# Patient Record
Sex: FEMALE | Race: ASIAN | NOT HISPANIC OR LATINO | URBAN - METROPOLITAN AREA
[De-identification: names, ages, dates, MRNs, and addresses within clinical notes are randomized per-mention and may not be internally consistent; named-entity substitution may affect disease eponyms.]

---

## 2019-06-11 ENCOUNTER — EMERGENCY (EMERGENCY)
Facility: HOSPITAL | Age: 29
LOS: 1 days | Discharge: HOME | End: 2019-06-11
Attending: EMERGENCY MEDICINE | Admitting: EMERGENCY MEDICINE
Payer: COMMERCIAL

## 2019-06-11 VITALS
OXYGEN SATURATION: 100 % | DIASTOLIC BLOOD PRESSURE: 89 MMHG | TEMPERATURE: 97 F | RESPIRATION RATE: 18 BRPM | HEART RATE: 59 BPM | SYSTOLIC BLOOD PRESSURE: 126 MMHG

## 2019-06-11 VITALS
SYSTOLIC BLOOD PRESSURE: 101 MMHG | HEART RATE: 68 BPM | OXYGEN SATURATION: 96 % | RESPIRATION RATE: 17 BRPM | DIASTOLIC BLOOD PRESSURE: 72 MMHG

## 2019-06-11 PROCEDURE — 99053 MED SERV 10PM-8AM 24 HR FAC: CPT

## 2019-06-11 PROCEDURE — 99283 EMERGENCY DEPT VISIT LOW MDM: CPT | Mod: 25

## 2019-06-11 RX ORDER — ACETAMINOPHEN 500 MG
650 TABLET ORAL ONCE
Refills: 0 | Status: DISCONTINUED | OUTPATIENT
Start: 2019-06-11 | End: 2019-06-11

## 2019-06-11 RX ORDER — OXYMETAZOLINE HYDROCHLORIDE 0.5 MG/ML
2 SPRAY NASAL ONCE
Refills: 0 | Status: COMPLETED | OUTPATIENT
Start: 2019-06-11 | End: 2019-06-11

## 2019-06-11 RX ORDER — IBUPROFEN 200 MG
600 TABLET ORAL ONCE
Refills: 0 | Status: DISCONTINUED | OUTPATIENT
Start: 2019-06-11 | End: 2019-06-11

## 2019-06-11 RX ORDER — LORATADINE 10 MG/1
10 TABLET ORAL ONCE
Refills: 0 | Status: DISCONTINUED | OUTPATIENT
Start: 2019-06-11 | End: 2019-06-11

## 2019-06-11 RX ADMIN — Medication 100 MILLIGRAM(S): at 05:11

## 2019-06-11 RX ADMIN — Medication 600 MILLIGRAM(S): at 05:11

## 2019-06-11 RX ADMIN — OXYMETAZOLINE HYDROCHLORIDE 2 SPRAY(S): 0.5 SPRAY NASAL at 05:11

## 2019-06-11 NOTE — ED PROVIDER NOTE - ATTENDING CONTRIBUTION TO CARE
HPI: 28 F w/ no PMH p/w 1 week of weakness, cough, sob, rhinorrhea and nasal congestion/discharge. Pt thinks she may have had a fever last week but never measured her temperature. Denies any sick contact or recent travel. Cough is productive of small amount of clear phlegm. She took leftover Amox for last 3 days and not helping. Denies any chest pain, abd pain, nausea, vomiting, hematuria or dysuria.  EXAM: NAD, lungs ctab, heart RRR, no M/R/G/JVD, no pitting edema BLE. abd soft nontender.   MDM: concern for URI, unlikely PNA vs PE. Will provide meds and reassess.

## 2019-06-11 NOTE — ED PROVIDER NOTE - NS ED ROS FT
CONSTITUTIONAL:  +fever that resolved  HEENT:  No rhinorrhea  SKIN:  No rash or itching.  CARDIOVASCULAR:  No chest pain, chest pressure or chest discomfort. No palpitations or edema.  RESPIRATORY:  +SOB + cough  GASTROINTESTINAL:  No nausea, vomiting or diarrhea. No abdominal pain.   GENITOURINARY:  Denies hematuria, dysuria.   NEUROLOGICAL:  No headache, dizziness, syncope.   MUSCULOSKELETAL:  No muscle, back pain, joint pain or stiffness.  HEMATOLOGIC:  No bleeding or bruising.

## 2019-06-11 NOTE — ED ADULT NURSE NOTE - OBJECTIVE STATEMENT
pt a&Ox3, c/o intermittent SOB, +congestion, productive cough x1 week. pt breathing even & unlabored, denies cp, abd pain, fever, chills, numbness, tingling, dizziness. MD NATHANIEL at bedside, awaiting further orders.

## 2019-06-11 NOTE — ED ADULT TRIAGE NOTE - CHIEF COMPLAINT QUOTE
Pt p/w with SOB for 3/4 days.  + congestion.  + productive cough.  denies fevers chills.  SOB worse lying down and increase SOB with activity.  respirations even unlabored at this time.  EKG in progress.

## 2019-06-11 NOTE — ED PROVIDER NOTE - OBJECTIVE STATEMENT
28 F w/ no PMH p/w 1 week of weakness, cough, sob, rhinorrhea and nasal congestion/discharge. Pt thinks she may have had a fever last week but never measured her temperature. Denies any sick contact or recent travel. Cough is productive of small amount of clear phlegm. Denies any chest pain, abd pain, nausea, vomiting, hematuria or dysuria.

## 2019-06-11 NOTE — ED ADULT NURSE NOTE - NSIMPLEMENTINTERV_GEN_ALL_ED
Implemented All Universal Safety Interventions:  [place] to call system. Call bell, personal items and telephone within reach. Instruct patient to call for assistance. Room bathroom lighting operational. Non-slip footwear when patient is off stretcher. Physically safe environment: no spills, clutter or unnecessary equipment. Stretcher in lowest position, wheels locked, appropriate side rails in place.

## 2019-06-11 NOTE — ED PROVIDER NOTE - PROGRESS NOTE DETAILS
Symptoms improved with meds. No longer coughing. Will discharge home, explained why there is no need for PO Abx at this time as she has no fever, lungs ctab. Will discharge and advise to f/u with her PMD. Return precautions explained.

## 2019-12-09 ENCOUNTER — RESULT REVIEW (OUTPATIENT)
Age: 29
End: 2019-12-09

## 2020-02-12 ENCOUNTER — APPOINTMENT (OUTPATIENT)
Dept: ORTHOPEDIC SURGERY | Facility: CLINIC | Age: 30
End: 2020-02-12
Payer: COMMERCIAL

## 2020-02-12 VITALS
HEIGHT: 61 IN | WEIGHT: 157 LBS | DIASTOLIC BLOOD PRESSURE: 83 MMHG | HEART RATE: 80 BPM | SYSTOLIC BLOOD PRESSURE: 116 MMHG | BODY MASS INDEX: 29.64 KG/M2

## 2020-02-12 DIAGNOSIS — Z78.9 OTHER SPECIFIED HEALTH STATUS: ICD-10-CM

## 2020-02-12 DIAGNOSIS — Z86.39 PERSONAL HISTORY OF OTHER ENDOCRINE, NUTRITIONAL AND METABOLIC DISEASE: ICD-10-CM

## 2020-02-12 DIAGNOSIS — M54.16 RADICULOPATHY, LUMBAR REGION: ICD-10-CM

## 2020-02-12 PROBLEM — Z00.00 ENCOUNTER FOR PREVENTIVE HEALTH EXAMINATION: Status: ACTIVE | Noted: 2020-02-12

## 2020-02-12 PROCEDURE — 99204 OFFICE O/P NEW MOD 45 MIN: CPT

## 2020-02-12 RX ORDER — IBUPROFEN 600 MG/1
600 TABLET, FILM COATED ORAL
Qty: 30 | Refills: 0 | Status: ACTIVE | COMMUNITY
Start: 2019-09-23

## 2020-02-12 RX ORDER — DICLOFENAC SODIUM 10 MG/G
1 GEL TOPICAL
Qty: 100 | Refills: 0 | Status: ACTIVE | COMMUNITY
Start: 2020-02-06

## 2020-02-12 RX ORDER — CYCLOBENZAPRINE HYDROCHLORIDE 5 MG/1
5 TABLET, FILM COATED ORAL
Qty: 10 | Refills: 0 | Status: ACTIVE | COMMUNITY
Start: 2020-02-06

## 2020-02-12 RX ORDER — LIDOCAINE AND PRILOCAINE 25; 25 MG/G; MG/G
2.5-2.5 CREAM TOPICAL
Qty: 30 | Refills: 0 | Status: ACTIVE | COMMUNITY
Start: 2019-12-02

## 2020-02-12 RX ORDER — TRIAMCINOLONE ACETONIDE 1 MG/G
0.1 CREAM TOPICAL
Qty: 15 | Refills: 0 | Status: ACTIVE | COMMUNITY
Start: 2020-01-31

## 2020-02-12 RX ORDER — MELOXICAM 15 MG/1
15 TABLET ORAL
Qty: 30 | Refills: 1 | Status: ACTIVE | COMMUNITY
Start: 2020-02-12 | End: 1900-01-01

## 2020-02-12 RX ORDER — ERGOCALCIFEROL 1.25 MG/1
1.25 MG CAPSULE, LIQUID FILLED ORAL
Qty: 4 | Refills: 0 | Status: ACTIVE | COMMUNITY
Start: 2019-11-25

## 2020-02-12 NOTE — REVIEW OF SYSTEMS
[Arthralgia] : arthralgia [Joint Stiffness] : joint stiffness [Joint Pain] : joint pain [Negative] : Psychiatric

## 2020-02-12 NOTE — PHYSICAL EXAM
[de-identified] : Physical examination of the lumbosacral spine discloses mild muscle spasm to the paravertebral lumbar region primarily on the left side. No acute N/V deficits the lower extremities; motor and sensory intact; deep tendon reflexes bilaterally 1+ symmetric. Negative straight leg raise. [de-identified] : Review of submitted x-rays of the lumbosacral spine and hips are reported within normal limits
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
## 2020-02-12 NOTE — DISCUSSION/SUMMARY
[de-identified] : The patient is advised on increasing her NSAID medications to Mobic. She declined referral back to physical therapy. Further investigational studies such as MRI evaluation are warranted if the patient's symptoms persist or worsen.

## 2020-03-11 ENCOUNTER — APPOINTMENT (OUTPATIENT)
Dept: MRI IMAGING | Facility: IMAGING CENTER | Age: 30
End: 2020-03-11

## 2020-04-23 ENCOUNTER — APPOINTMENT (OUTPATIENT)
Dept: ORTHOPEDIC SURGERY | Facility: CLINIC | Age: 30
End: 2020-04-23

## 2020-05-20 ENCOUNTER — APPOINTMENT (OUTPATIENT)
Dept: PULMONOLOGY | Facility: CLINIC | Age: 30
End: 2020-05-20
Payer: MEDICAID

## 2020-05-20 DIAGNOSIS — R06.02 SHORTNESS OF BREATH: ICD-10-CM

## 2020-05-20 DIAGNOSIS — Z83.3 FAMILY HISTORY OF DIABETES MELLITUS: ICD-10-CM

## 2020-05-20 PROCEDURE — 99204 OFFICE O/P NEW MOD 45 MIN: CPT | Mod: 95

## 2020-05-20 RX ORDER — LEVOTHYROXINE SODIUM 0.1 MG/1
100 TABLET ORAL
Refills: 0 | Status: ACTIVE | COMMUNITY
Start: 2019-08-06

## 2020-05-20 NOTE — HISTORY OF PRESENT ILLNESS
[Medical Office: ([facility])___] : at the medical office located in  [Home] : at home, [unfilled] , at the time of the visit. [Snoring] : snoring [Frequent Nocturnal Awakening] : frequent nocturnal awakening [Witnessed Apneas] : witnessed sleep apnea [ESS: ___] : ESS score [unfilled] [Daytime Somnolence] : daytime somnolence [Unintentional Sleep While Inactive] : unintentional sleep while inactive [Awakes with Headache] : headache upon awakening [Awakes Unrefreshed] : awakening unrefreshed [Awakening With Dry Mouth] : awakening with dry mouth [To Bed: ___] : ~he/she~ goes to bed at [unfilled] [Arises: ___] : arises at [unfilled] [Sleep Onset Latency: ___ minutes] : sleep onset latency of [unfilled] minutes reported [Nocturnal Awakenings: ___] : ~he/she~ typically has [unfilled] nocturnal awakenings [WASO: ___] : Wake time after sleep onset is [unfilled] [TST: ___] : Total sleep time is [unfilled] [Daytime Sleep: ___] : daytime sleep: [unfilled] [FreeTextEntry1] : 29 year old female here for a telehealth visit due to COVID19 pandemic for shortness of breath and difficulty with sleep for 1 month.\par \par Patient reports testing positive for COVID19 on 4/2. Her symptoms were relatively mild with low grade fever and "heating sensations in head". Since then, she began feeling shortness of breath and she feels that "something is blocking her breathing" at night. She also reports pain on the right side of the chest, non-reproducible, nonpleuritic along with palpitations. She was evaluated by cardiology - had an echocardiogram and CTA chest with were both reportedly normal. She was prescribed propranolol which she did not tolerate and was switched to metoprolol. She has a pulse oximeter and oxygen saturations remain at 99% and HR goes to 130s with exertion. She has been isolating from rest of her family since the diagnosis and only recently with them two days prior. She has had repeat COVID testing on 4/22 which was positive but tested negative on 5/2.\par \par She also reports a swelling in throat. She was evaluated by ENT, Dr. Perlman. "No blockage" was detected on his exam but she reports a swollen sensation particularly in the right side of her throat. She was prescribed Cefdinir for "mucous in nose" for 10 days.\par \par She endorses snoring, does not believe it is severe. She did not have sleeping issues prior to COVID. She reports popping noises, that went away with azithromycin but the sensation that breathing is blocked remains. These episodes occur 2 - 3 times per week. She endorses a 10 lb weight loss over the last month.\par \par Comorbid medical conditions include hypothyroidism on Synthroid, vertigo. [Unintentional Sleep while Active] : no unintentional sleep while active [Recent  Weight Gain] : no recent weight gain

## 2020-05-20 NOTE — ASSESSMENT
[FreeTextEntry1] : This is a 29 year old female with recent COVID19 infection with dyspnea on exertion, palpitations, and sleep issues.  As she reports that there is no associated desaturations with her dyspnea, I am suspecting deconditioning as a cause as she has largely been sedentary for the last two months. In addition, TTE and CTA are reportedly normal. I have asked her to have the reports faxed over to the office. Anxiety may be playing a role as well. Reassurance was provided. Will check a sleep study to evaluate for sleep-disordering breathing as a cause of her nocturnal symptoms. \par \par Follow up after HST (PSG cannot be ordered due to COVID19 pandemic at this time).\par

## 2020-05-20 NOTE — REASON FOR VISIT
[Initial Evaluation] : an initial evaluation [Shortness of Breath] : shortness of Breath [Sleep Apnea] : sleep apnea

## 2020-05-26 ENCOUNTER — EMERGENCY (EMERGENCY)
Facility: HOSPITAL | Age: 30
LOS: 1 days | Discharge: HOME | End: 2020-05-26
Attending: EMERGENCY MEDICINE | Admitting: EMERGENCY MEDICINE
Payer: MEDICAID

## 2020-05-26 VITALS
DIASTOLIC BLOOD PRESSURE: 96 MMHG | SYSTOLIC BLOOD PRESSURE: 130 MMHG | RESPIRATION RATE: 16 BRPM | TEMPERATURE: 98 F | OXYGEN SATURATION: 100 % | HEART RATE: 138 BPM

## 2020-05-26 VITALS
RESPIRATION RATE: 20 BRPM | SYSTOLIC BLOOD PRESSURE: 114 MMHG | DIASTOLIC BLOOD PRESSURE: 75 MMHG | HEART RATE: 90 BPM | OXYGEN SATURATION: 99 % | TEMPERATURE: 98 F

## 2020-05-26 LAB
ALBUMIN SERPL ELPH-MCNC: 4.5 G/DL — SIGNIFICANT CHANGE UP (ref 3.3–5)
ALP SERPL-CCNC: 73 U/L — SIGNIFICANT CHANGE UP (ref 40–120)
ALT FLD-CCNC: 15 U/L — SIGNIFICANT CHANGE UP (ref 4–33)
ANION GAP SERPL CALC-SCNC: 14 MMO/L — SIGNIFICANT CHANGE UP (ref 7–14)
APTT BLD: 37.8 SEC — HIGH (ref 27.5–36.3)
AST SERPL-CCNC: 14 U/L — SIGNIFICANT CHANGE UP (ref 4–32)
BASOPHILS # BLD AUTO: 0.03 K/UL — SIGNIFICANT CHANGE UP (ref 0–0.2)
BASOPHILS NFR BLD AUTO: 0.3 % — SIGNIFICANT CHANGE UP (ref 0–2)
BILIRUB SERPL-MCNC: 0.3 MG/DL — SIGNIFICANT CHANGE UP (ref 0.2–1.2)
BUN SERPL-MCNC: 5 MG/DL — LOW (ref 7–23)
CALCIUM SERPL-MCNC: 9.5 MG/DL — SIGNIFICANT CHANGE UP (ref 8.4–10.5)
CHLORIDE SERPL-SCNC: 103 MMOL/L — SIGNIFICANT CHANGE UP (ref 98–107)
CO2 SERPL-SCNC: 22 MMOL/L — SIGNIFICANT CHANGE UP (ref 22–31)
CREAT SERPL-MCNC: 0.58 MG/DL — SIGNIFICANT CHANGE UP (ref 0.5–1.3)
D DIMER BLD IA.RAPID-MCNC: < 150 NG/ML — SIGNIFICANT CHANGE UP
EOSINOPHIL # BLD AUTO: 0.22 K/UL — SIGNIFICANT CHANGE UP (ref 0–0.5)
EOSINOPHIL NFR BLD AUTO: 2.3 % — SIGNIFICANT CHANGE UP (ref 0–6)
GLUCOSE SERPL-MCNC: 144 MG/DL — HIGH (ref 70–99)
HCG SERPL-ACNC: < 5 MIU/ML — SIGNIFICANT CHANGE UP
HCT VFR BLD CALC: 42.7 % — SIGNIFICANT CHANGE UP (ref 34.5–45)
HGB BLD-MCNC: 13.7 G/DL — SIGNIFICANT CHANGE UP (ref 11.5–15.5)
IMM GRANULOCYTES NFR BLD AUTO: 0.4 % — SIGNIFICANT CHANGE UP (ref 0–1.5)
INR BLD: 1.04 — SIGNIFICANT CHANGE UP (ref 0.88–1.17)
LYMPHOCYTES # BLD AUTO: 1.94 K/UL — SIGNIFICANT CHANGE UP (ref 1–3.3)
LYMPHOCYTES # BLD AUTO: 20.2 % — SIGNIFICANT CHANGE UP (ref 13–44)
MCHC RBC-ENTMCNC: 28.1 PG — SIGNIFICANT CHANGE UP (ref 27–34)
MCHC RBC-ENTMCNC: 32.1 % — SIGNIFICANT CHANGE UP (ref 32–36)
MCV RBC AUTO: 87.5 FL — SIGNIFICANT CHANGE UP (ref 80–100)
MONOCYTES # BLD AUTO: 0.81 K/UL — SIGNIFICANT CHANGE UP (ref 0–0.9)
MONOCYTES NFR BLD AUTO: 8.4 % — SIGNIFICANT CHANGE UP (ref 2–14)
NEUTROPHILS # BLD AUTO: 6.58 K/UL — SIGNIFICANT CHANGE UP (ref 1.8–7.4)
NEUTROPHILS NFR BLD AUTO: 68.4 % — SIGNIFICANT CHANGE UP (ref 43–77)
NRBC # FLD: 0 K/UL — SIGNIFICANT CHANGE UP (ref 0–0)
NT-PROBNP SERPL-SCNC: 84.87 PG/ML — SIGNIFICANT CHANGE UP
PLATELET # BLD AUTO: 490 K/UL — HIGH (ref 150–400)
PMV BLD: 9.4 FL — SIGNIFICANT CHANGE UP (ref 7–13)
POTASSIUM SERPL-MCNC: 3.8 MMOL/L — SIGNIFICANT CHANGE UP (ref 3.5–5.3)
POTASSIUM SERPL-SCNC: 3.8 MMOL/L — SIGNIFICANT CHANGE UP (ref 3.5–5.3)
PROT SERPL-MCNC: 7.7 G/DL — SIGNIFICANT CHANGE UP (ref 6–8.3)
PROTHROM AB SERPL-ACNC: 12 SEC — SIGNIFICANT CHANGE UP (ref 9.8–13.1)
RBC # BLD: 4.88 M/UL — SIGNIFICANT CHANGE UP (ref 3.8–5.2)
RBC # FLD: 13.2 % — SIGNIFICANT CHANGE UP (ref 10.3–14.5)
SODIUM SERPL-SCNC: 139 MMOL/L — SIGNIFICANT CHANGE UP (ref 135–145)
TROPONIN T, HIGH SENSITIVITY: < 6 NG/L — SIGNIFICANT CHANGE UP (ref ?–14)
TSH SERPL-MCNC: 0.4 UIU/ML — SIGNIFICANT CHANGE UP (ref 0.27–4.2)
WBC # BLD: 9.62 K/UL — SIGNIFICANT CHANGE UP (ref 3.8–10.5)
WBC # FLD AUTO: 9.62 K/UL — SIGNIFICANT CHANGE UP (ref 3.8–10.5)

## 2020-05-26 PROCEDURE — 93010 ELECTROCARDIOGRAM REPORT: CPT

## 2020-05-26 PROCEDURE — 70360 X-RAY EXAM OF NECK: CPT | Mod: 26

## 2020-05-26 PROCEDURE — 71045 X-RAY EXAM CHEST 1 VIEW: CPT | Mod: 26

## 2020-05-26 PROCEDURE — 99284 EMERGENCY DEPT VISIT MOD MDM: CPT | Mod: 25

## 2020-05-26 RX ORDER — MECLIZINE HCL 12.5 MG
1 TABLET ORAL
Qty: 12 | Refills: 0
Start: 2020-05-26 | End: 2020-05-29

## 2020-05-26 RX ORDER — SODIUM CHLORIDE 9 MG/ML
1000 INJECTION INTRAMUSCULAR; INTRAVENOUS; SUBCUTANEOUS ONCE
Refills: 0 | Status: COMPLETED | OUTPATIENT
Start: 2020-05-26 | End: 2020-05-26

## 2020-05-26 RX ORDER — LEVOTHYROXINE SODIUM 125 MCG
1 TABLET ORAL
Qty: 0 | Refills: 0 | DISCHARGE

## 2020-05-26 RX ORDER — FAMOTIDINE 10 MG/ML
20 INJECTION INTRAVENOUS ONCE
Refills: 0 | Status: COMPLETED | OUTPATIENT
Start: 2020-05-26 | End: 2020-05-26

## 2020-05-26 RX ADMIN — FAMOTIDINE 20 MILLIGRAM(S): 10 INJECTION INTRAVENOUS at 17:58

## 2020-05-26 RX ADMIN — SODIUM CHLORIDE 1000 MILLILITER(S): 9 INJECTION INTRAMUSCULAR; INTRAVENOUS; SUBCUTANEOUS at 17:58

## 2020-05-26 RX ADMIN — SODIUM CHLORIDE 1000 MILLILITER(S): 9 INJECTION INTRAMUSCULAR; INTRAVENOUS; SUBCUTANEOUS at 16:14

## 2020-05-26 NOTE — ED PROVIDER NOTE - NS ED ROS FT
CONSTITUTIONAL:  +Weight loss. No fever  HEENT: No headache, no photophobia  SKIN:  No rash or itching.  CARDIOVASCULAR:  +Palpitations. No chest pain, chest pressure or chest discomfort. No edema.  RESPIRATORY: +SOB. No cough  GASTROINTESTINAL:  No nausea, vomiting or diarrhea. No abdominal pain.   GENITOURINARY:  Denies hematuria, dysuria.   NEUROLOGICAL:  +Lightheadedness. +Unsteady. No syncope  MUSCULOSKELETAL:  No muscle, back pain, joint pain or stiffness.  HEMATOLOGIC:  No bleeding or bruising.

## 2020-05-26 NOTE — ED PROVIDER NOTE - CARE PLAN
Assessment and plan of treatment:	Patient is a 30y/o F with hx of hypothyroidism, vertigo, and recent COVID infection presenting with tachycardia for 4wks and SOB for 2 wks. Principal Discharge DX:	Tachycardia  Assessment and plan of treatment:	Patient is a 28y/o F with hx of hypothyroidism, vertigo, and recent COVID infection presenting with tachycardia for 4wks and SOB for 2 wks. Principal Discharge DX:	Tachycardia  Assessment and plan of treatment:	Patient is a 28y/o F with hx of hypothyroidism, vertigo, and recent COVID infection presenting with tachycardia for 4wks and SOB for 2 wks.  Secondary Diagnosis:	SOB (shortness of breath)

## 2020-05-26 NOTE — ED ADULT TRIAGE NOTE - CHIEF COMPLAINT QUOTE
Pt. c/o elevated HR. States it started the end of April but has become worse associated with headaches, sob, palpitations and lightheadedness. Denies cp, cough or fevers. Pt was positive for covid beginning of April. Had positive antibody test on 5/13/20.

## 2020-05-26 NOTE — ED PROVIDER NOTE - NSFOLLOWUPCLINICS_GEN_ALL_ED_FT
[facility]  Cardiology  [address] [place] [id number]  Phone: [phone]  Fax:   Follow Up Time:

## 2020-05-26 NOTE — ED PROVIDER NOTE - OBJECTIVE STATEMENT
The patient is a 28y/o Female with hx of hypothyroidism, vertigo, and recent COVID infection presenting with 4 weeks of tachycardia and two weeks of dyspnea and lightheadedness. She was COVID-positive in early April and had positive antibodies on May 13. Four weeks ago (4/23), she recorded a HR of 160s after feeling palpitations; the pt is an RN. She reports the HR has declined somewhat, to usually be in the 130s for the past two weeks. She reports occasional HR of 50s-60s. Also in the past two weeks, she endorses dyspnea at rest and worsening on exertion, "pulsations and vibrations" on the R neck, poor sleep, and unsteadiness besides her usual vertigo. Endorses taking levothyroxine 100mcg qd. Endorses 13lb wt loss over past month. Denies headache, change in vision, photosensitivity, chest pain, abdominal pain, n/v/d, changes in appetite.

## 2020-05-26 NOTE — ED PROVIDER NOTE - PATIENT PORTAL LINK FT
You can access the FollowMyHealth Patient Portal offered by [facility] by registering at the following website: [email or web address]. By joining [employer]’s FollowMyHealth portal, you will also be able to view your health information using other applications (apps) compatible with our system.

## 2020-05-26 NOTE — ED PROVIDER NOTE - ATTENDING CONTRIBUTION TO CARE
DR. GARCÍA, ATTENDING MD-  I performed a face to face bedside interview with the patient regarding history of present illness, review of symptoms and past medical history. I completed an independent physical exam.  I have discussed the patient's plan of care with the resident.   Documentation as above in the note.    30 y/o female h/o hypothyroidism p/w fast heart rate, sob, fb sensation in throat x2-3 weeks.  States had recent covid infection in April.  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  No h/o dvt/pe, no ocp use, no leg pain/swelling.  Denies f/c, ha, neck stiffness, cp, cough, abd pain, n/v/d, dysuria, rash.  Afebrile, anxious, oropharynx clear, uvula midline, full rom neck, no palp mass, no carotid bruit, ctabil, s1s2 tachy no m/r/g, abd soft non ttp no r/g, no cva tenderness b/l, no leg swelling b/l, no rash.  Evaluate for anemia, pregnancy, lyte abn, consider pe low risk by wells cannot perc out d/t hr.  Obtain cbc cmp dimer trop cxr xr neck ekg ucg give ivf bolus reassess.

## 2020-05-26 NOTE — ED PROVIDER NOTE - CLINICAL SUMMARY MEDICAL DECISION MAKING FREE TEXT BOX
Verena: Patient is a 28y/o F with hx of hypothyroidism, vertigo, and recent COVID infection presenting with tachycardia for 4wks and SOB for 2 wks. Plan: rehydration, EKG, CXR, TSH, reassess.     Low concern for PE at this time. Patient does not require d dimer testing. Verena: Patient is a 30y/o F with hx of hypothyroidism, vertigo, and recent COVID infection presenting with tachycardia for 4wks and SOB for 2 wks. Plan: rehydration, EKG, CXR, TSH, reassess.

## 2020-05-26 NOTE — ED PROVIDER NOTE - PLAN OF CARE
Patient is a 30y/o F with hx of hypothyroidism, vertigo, and recent COVID infection presenting with tachycardia for 4wks and SOB for 2 wks.

## 2020-05-26 NOTE — ED PROVIDER NOTE - PHYSICAL EXAMINATION
GENERAL APPEARANCE: NAD  HEENT:  NC/AT, clear conjunctiva  NECK: Neck supple, non-tender without lymphadenopathy, masses. No carotid bruits  CARDIAC: Tachycardic. Normal S1 and S2. No S3, S4 or m/r/g  LUNGS: CTAB  ABDOMEN:    MUSKULOSKELETAL: No joint erythema or tenderness.   EXTREMITIES: No edema.  NEUROLOGICAL: No focal neurologic deficit. Negative Romberg  SKIN: Skin clean, dry, intact. Normal turgor  PSYCHIATRIC: Normal affect and behavior.

## 2020-05-26 NOTE — ED PROVIDER NOTE - NSFOLLOWUPINSTRUCTIONS_ED_ALL_ED_FT
You were seen for rapid heart rate.     Seek immediate medical assistance for any new or worsening symptoms such as chest pain or any other worsening or concerning symptoms to you.     Follow up with your PCP this week. You were seen for rapid heart rate.     Seek immediate medical assistance for any new or worsening symptoms such as chest pain or any other worsening or concerning symptoms to you.     Follow up with your PCP this week.    Follow up with a cardiologist for 24 hour holter monitor.

## 2020-06-29 PROBLEM — R42 DIZZINESS AND GIDDINESS: Chronic | Status: ACTIVE | Noted: 2020-05-26

## 2020-06-29 PROBLEM — E03.9 HYPOTHYROIDISM, UNSPECIFIED: Chronic | Status: ACTIVE | Noted: 2020-05-26

## 2020-07-20 ENCOUNTER — APPOINTMENT (OUTPATIENT)
Dept: SLEEP CENTER | Facility: CLINIC | Age: 30
End: 2020-07-20

## 2020-08-14 ENCOUNTER — APPOINTMENT (OUTPATIENT)
Dept: DERMATOLOGY | Facility: CLINIC | Age: 30
End: 2020-08-14
Payer: MEDICAID

## 2020-08-14 VITALS — BODY MASS INDEX: 28.32 KG/M2 | WEIGHT: 150 LBS | HEIGHT: 61 IN

## 2020-08-14 VITALS — TEMPERATURE: 97.3 F

## 2020-08-14 DIAGNOSIS — Z78.9 OTHER SPECIFIED HEALTH STATUS: ICD-10-CM

## 2020-08-14 DIAGNOSIS — L70.0 ACNE VULGARIS: ICD-10-CM

## 2020-08-14 DIAGNOSIS — L65.0 TELOGEN EFFLUVIUM: ICD-10-CM

## 2020-08-14 DIAGNOSIS — L64.9 ANDROGENIC ALOPECIA, UNSPECIFIED: ICD-10-CM

## 2020-08-14 DIAGNOSIS — L82.1 OTHER SEBORRHEIC KERATOSIS: ICD-10-CM

## 2020-08-14 PROCEDURE — 99203 OFFICE O/P NEW LOW 30 MIN: CPT | Mod: GC

## 2020-08-14 RX ORDER — BENZOYL PEROXIDE 5 G/100G
5 GEL TOPICAL DAILY
Qty: 1 | Refills: 3 | Status: ACTIVE | COMMUNITY
Start: 2020-08-14 | End: 1900-01-01

## 2020-08-14 RX ORDER — CLINDAMYCIN PHOSPHATE 10 MG/ML
1 LOTION TOPICAL
Qty: 1 | Refills: 2 | Status: ACTIVE | COMMUNITY
Start: 2020-08-14 | End: 1900-01-01

## 2020-08-27 RX ORDER — TRETINOIN 0.25 MG/G
0.03 CREAM TOPICAL
Qty: 1 | Refills: 3 | Status: ACTIVE | COMMUNITY
Start: 2020-08-14

## 2020-11-06 ENCOUNTER — APPOINTMENT (OUTPATIENT)
Dept: DERMATOLOGY | Facility: CLINIC | Age: 30
End: 2020-11-06

## 2021-01-08 ENCOUNTER — APPOINTMENT (OUTPATIENT)
Dept: ORTHOPEDIC SURGERY | Facility: CLINIC | Age: 31
End: 2021-01-08
Payer: MEDICAID

## 2021-01-08 VITALS
BODY MASS INDEX: 28.32 KG/M2 | SYSTOLIC BLOOD PRESSURE: 124 MMHG | DIASTOLIC BLOOD PRESSURE: 86 MMHG | WEIGHT: 150 LBS | HEART RATE: 68 BPM | HEIGHT: 61 IN

## 2021-01-08 VITALS — TEMPERATURE: 97.3 F

## 2021-01-08 DIAGNOSIS — M54.16 RADICULOPATHY, LUMBAR REGION: ICD-10-CM

## 2021-01-08 PROCEDURE — 99072 ADDL SUPL MATRL&STAF TM PHE: CPT

## 2021-01-08 PROCEDURE — 99214 OFFICE O/P EST MOD 30 MIN: CPT

## 2021-02-08 ENCOUNTER — RX RENEWAL (OUTPATIENT)
Age: 31
End: 2021-02-08

## 2021-02-08 RX ORDER — DICLOFENAC SODIUM 75 MG/1
75 TABLET, DELAYED RELEASE ORAL
Qty: 60 | Refills: 1 | Status: ACTIVE | COMMUNITY
Start: 2021-01-08 | End: 1900-01-01

## 2021-07-13 ENCOUNTER — EMERGENCY (EMERGENCY)
Facility: HOSPITAL | Age: 31
LOS: 1 days | Discharge: HOME | End: 2021-07-13
Attending: STUDENT IN AN ORGANIZED HEALTH CARE EDUCATION/TRAINING PROGRAM | Admitting: STUDENT IN AN ORGANIZED HEALTH CARE EDUCATION/TRAINING PROGRAM
Payer: COMMERCIAL

## 2021-07-13 VITALS
DIASTOLIC BLOOD PRESSURE: 90 MMHG | RESPIRATION RATE: 16 BRPM | TEMPERATURE: 98 F | HEART RATE: 75 BPM | SYSTOLIC BLOOD PRESSURE: 123 MMHG | OXYGEN SATURATION: 100 %

## 2021-07-13 VITALS
SYSTOLIC BLOOD PRESSURE: 131 MMHG | DIASTOLIC BLOOD PRESSURE: 88 MMHG | TEMPERATURE: 98 F | HEART RATE: 80 BPM | RESPIRATION RATE: 12 BRPM | OXYGEN SATURATION: 100 %

## 2021-07-13 LAB
ALBUMIN SERPL ELPH-MCNC: 4.2 G/DL — SIGNIFICANT CHANGE UP (ref 3.3–5)
ALP SERPL-CCNC: 71 U/L — SIGNIFICANT CHANGE UP (ref 40–120)
ALT FLD-CCNC: 11 U/L — SIGNIFICANT CHANGE UP (ref 4–33)
ANION GAP SERPL CALC-SCNC: 14 MMOL/L — SIGNIFICANT CHANGE UP (ref 7–14)
APPEARANCE UR: CLEAR — SIGNIFICANT CHANGE UP
AST SERPL-CCNC: 13 U/L — SIGNIFICANT CHANGE UP (ref 4–32)
BASOPHILS # BLD AUTO: 0.02 K/UL — SIGNIFICANT CHANGE UP (ref 0–0.2)
BASOPHILS NFR BLD AUTO: 0.2 % — SIGNIFICANT CHANGE UP (ref 0–2)
BILIRUB SERPL-MCNC: 0.2 MG/DL — SIGNIFICANT CHANGE UP (ref 0.2–1.2)
BILIRUB UR-MCNC: NEGATIVE — SIGNIFICANT CHANGE UP
BUN SERPL-MCNC: 9 MG/DL — SIGNIFICANT CHANGE UP (ref 7–23)
CALCIUM SERPL-MCNC: 9 MG/DL — SIGNIFICANT CHANGE UP (ref 8.4–10.5)
CHLORIDE SERPL-SCNC: 104 MMOL/L — SIGNIFICANT CHANGE UP (ref 98–107)
CO2 SERPL-SCNC: 21 MMOL/L — LOW (ref 22–31)
COLOR SPEC: SIGNIFICANT CHANGE UP
CREAT SERPL-MCNC: 0.55 MG/DL — SIGNIFICANT CHANGE UP (ref 0.5–1.3)
DIFF PNL FLD: NEGATIVE — SIGNIFICANT CHANGE UP
EOSINOPHIL # BLD AUTO: 0.26 K/UL — SIGNIFICANT CHANGE UP (ref 0–0.5)
EOSINOPHIL NFR BLD AUTO: 2.6 % — SIGNIFICANT CHANGE UP (ref 0–6)
GLUCOSE SERPL-MCNC: 101 MG/DL — HIGH (ref 70–99)
GLUCOSE UR QL: NEGATIVE — SIGNIFICANT CHANGE UP
HCT VFR BLD CALC: 40.9 % — SIGNIFICANT CHANGE UP (ref 34.5–45)
HGB BLD-MCNC: 13 G/DL — SIGNIFICANT CHANGE UP (ref 11.5–15.5)
IANC: 6.53 K/UL — SIGNIFICANT CHANGE UP (ref 1.5–8.5)
IMM GRANULOCYTES NFR BLD AUTO: 0.2 % — SIGNIFICANT CHANGE UP (ref 0–1.5)
KETONES UR-MCNC: NEGATIVE — SIGNIFICANT CHANGE UP
LEUKOCYTE ESTERASE UR-ACNC: NEGATIVE — SIGNIFICANT CHANGE UP
LIDOCAIN IGE QN: 22 U/L — SIGNIFICANT CHANGE UP (ref 7–60)
LYMPHOCYTES # BLD AUTO: 2.22 K/UL — SIGNIFICANT CHANGE UP (ref 1–3.3)
LYMPHOCYTES # BLD AUTO: 22.4 % — SIGNIFICANT CHANGE UP (ref 13–44)
MAGNESIUM SERPL-MCNC: 2.2 MG/DL — SIGNIFICANT CHANGE UP (ref 1.6–2.6)
MCHC RBC-ENTMCNC: 28.4 PG — SIGNIFICANT CHANGE UP (ref 27–34)
MCHC RBC-ENTMCNC: 31.8 GM/DL — LOW (ref 32–36)
MCV RBC AUTO: 89.3 FL — SIGNIFICANT CHANGE UP (ref 80–100)
MONOCYTES # BLD AUTO: 0.86 K/UL — SIGNIFICANT CHANGE UP (ref 0–0.9)
MONOCYTES NFR BLD AUTO: 8.7 % — SIGNIFICANT CHANGE UP (ref 2–14)
NEUTROPHILS # BLD AUTO: 6.53 K/UL — SIGNIFICANT CHANGE UP (ref 1.8–7.4)
NEUTROPHILS NFR BLD AUTO: 65.9 % — SIGNIFICANT CHANGE UP (ref 43–77)
NITRITE UR-MCNC: NEGATIVE — SIGNIFICANT CHANGE UP
NRBC # BLD: 0 /100 WBCS — SIGNIFICANT CHANGE UP
NRBC # FLD: 0 K/UL — SIGNIFICANT CHANGE UP
PH UR: 6.5 — SIGNIFICANT CHANGE UP (ref 5–8)
PHOSPHATE SERPL-MCNC: 3.3 MG/DL — SIGNIFICANT CHANGE UP (ref 2.5–4.5)
PLATELET # BLD AUTO: 438 K/UL — HIGH (ref 150–400)
POTASSIUM SERPL-MCNC: 4 MMOL/L — SIGNIFICANT CHANGE UP (ref 3.5–5.3)
POTASSIUM SERPL-SCNC: 4 MMOL/L — SIGNIFICANT CHANGE UP (ref 3.5–5.3)
PROT SERPL-MCNC: 7.5 G/DL — SIGNIFICANT CHANGE UP (ref 6–8.3)
PROT UR-MCNC: NEGATIVE — SIGNIFICANT CHANGE UP
RBC # BLD: 4.58 M/UL — SIGNIFICANT CHANGE UP (ref 3.8–5.2)
RBC # FLD: 12.7 % — SIGNIFICANT CHANGE UP (ref 10.3–14.5)
SODIUM SERPL-SCNC: 139 MMOL/L — SIGNIFICANT CHANGE UP (ref 135–145)
SP GR SPEC: 1.01 — LOW (ref 1.01–1.02)
UROBILINOGEN FLD QL: SIGNIFICANT CHANGE UP
WBC # BLD: 9.91 K/UL — SIGNIFICANT CHANGE UP (ref 3.8–10.5)
WBC # FLD AUTO: 9.91 K/UL — SIGNIFICANT CHANGE UP (ref 3.8–10.5)

## 2021-07-13 PROCEDURE — 99284 EMERGENCY DEPT VISIT MOD MDM: CPT

## 2021-07-13 RX ORDER — ACETAMINOPHEN 500 MG
975 TABLET ORAL ONCE
Refills: 0 | Status: COMPLETED | OUTPATIENT
Start: 2021-07-13 | End: 2021-07-13

## 2021-07-13 RX ORDER — SODIUM CHLORIDE 9 MG/ML
1000 INJECTION INTRAMUSCULAR; INTRAVENOUS; SUBCUTANEOUS ONCE
Refills: 0 | Status: COMPLETED | OUTPATIENT
Start: 2021-07-13 | End: 2021-07-13

## 2021-07-13 RX ORDER — FAMOTIDINE 10 MG/ML
20 INJECTION INTRAVENOUS ONCE
Refills: 0 | Status: COMPLETED | OUTPATIENT
Start: 2021-07-13 | End: 2021-07-13

## 2021-07-13 RX ORDER — METOCLOPRAMIDE HCL 10 MG
10 TABLET ORAL ONCE
Refills: 0 | Status: COMPLETED | OUTPATIENT
Start: 2021-07-13 | End: 2021-07-13

## 2021-07-13 RX ORDER — MECLIZINE HCL 12.5 MG
25 TABLET ORAL ONCE
Refills: 0 | Status: COMPLETED | OUTPATIENT
Start: 2021-07-13 | End: 2021-07-13

## 2021-07-13 RX ADMIN — FAMOTIDINE 20 MILLIGRAM(S): 10 INJECTION INTRAVENOUS at 19:06

## 2021-07-13 RX ADMIN — Medication 25 MILLIGRAM(S): at 19:07

## 2021-07-13 RX ADMIN — SODIUM CHLORIDE 1000 MILLILITER(S): 9 INJECTION INTRAMUSCULAR; INTRAVENOUS; SUBCUTANEOUS at 19:06

## 2021-07-13 RX ADMIN — Medication 975 MILLIGRAM(S): at 19:35

## 2021-07-13 RX ADMIN — Medication 10 MILLIGRAM(S): at 19:07

## 2021-07-13 RX ADMIN — Medication 975 MILLIGRAM(S): at 19:06

## 2021-07-13 NOTE — ED ADULT TRIAGE NOTE - CHIEF COMPLAINT QUOTE
Pt complaint dizziness with room spinning sensation, urinary frequency, and lethargy 4xdays. Denies hematuria, emesis, chest pain or SOB. History of vertigo, hypothyroid

## 2021-07-13 NOTE — ED PROVIDER NOTE - CLINICAL SUMMARY MEDICAL DECISION MAKING FREE TEXT BOX
Yimi MONTIEL: 30F hx of hypothyroidism presents with a cc of dizziness lightheadedness mild diffuse GI upset  a/w increased urinary frequency and looser stools and feeling of sensation as if "needs to have a bowel movement" even after she goes to the bathroom sx present x4 days feels similar to prior episodes of vertigo however this episode more severe exam vss non toxic PE as above ddx c/f likely peripheral vertigo however will alaso eval for uti given frequency, check labs, upreg give ivf meds reassess thereafter.

## 2021-07-13 NOTE — ED ADULT NURSE NOTE - OBJECTIVE STATEMENT
Received report in CDU at this time, no initial nurse note documented. PT reporting dizziness, abdominal pain, urinary frequency for 4 days. States dizziness and room spinning similar to previous episode of vertigo. Pt reports nausea with no vomiting. Pt is AOX4. Pt denies chest pain or SOB. PT respirations even an unlabored. Pt appears to be comfortable, in NAD. Pt denies fever, chills, /v/d. Pt denies  dysuria, hematuria. awaiting further orders, will continue to monitor.

## 2021-07-13 NOTE — ED PROVIDER NOTE - OBJECTIVE STATEMENT
Yimi MONTIEL: 30F hx of hypothyroidism presents with a cc of dizziness lightheadedness mild diffuse GI upset  a/w increased urinary frequency and looser stools and feeling of sensation as if "needs to have a bowel movement" even after she goes to the bathroom sx present x4 days feels similar to prior episodes of vertigo however this episode more severe. Feels is worse if she moves her head, which is typical of her vertigo. +nausea but no vomiting.  Denies/f/c/cp/sob. Denies headache, syncope, abdominal pain. Denies edema. Denies hematuria, BRBPR, tarry stools, diarrhea, constipation.

## 2021-07-13 NOTE — ED ADULT NURSE NOTE - NSIMPLEMENTINTERV_GEN_ALL_ED
Implemented All Universal Safety Interventions:  [place] to call system. Call bell, personal items and telephone within reach. Instruct patient to call for assistance. Room bathroom lighting operational. Non-slip footwear when patient is off stretcher. Physically safe environment: no spills, clutter or unnecessary equipment. Stretcher in lowest position, wheels locked, appropriate side rails in place.

## 2021-07-13 NOTE — ED PROVIDER NOTE - PROGRESS NOTE DETAILS
Calixto Starks DO - pt feels better, w/u unremarkable. Comfortable w/ plan of dc w/ fu and return precautions. All questions answered. Will give pt neuro fu, has ENT fu. Yimi MONTIEL: Pt signed out to my colleague Dr. Starks pending labs reassessment.   (Back Charting)

## 2021-07-13 NOTE — ED PROVIDER NOTE - PHYSICAL EXAMINATION
Yimi MONTIEL:  VITALS: Initial triage and subsequent vitals have been reviewed by me.  GEN APPEARANCE: WDWN, alert and cooperative, non-toxic appearing and in NAD  HEAD: Atraumatic, normocephalic   EYES: PERRLa, EOMI, vision grossly intact.   EARS: Gross hearing intact.   NOSE: No nasal discharge, no external evidence of epistaxis.   NECK: Supple  CV: RRR, S1S2, no c/r/m/g. No cyanosis or pallor. Extremities warm, well perfused. Cap refill <2 seconds. No bruits.   LUNGS: CTAB. No wheezing. No rales. No rhonchi. No diminished breath sounds.   ABDOMEN: Soft, NTND. No guarding or rebound. No masses.   MSK/EXT: Spine appears normal, no spine point tenderness. No CVA ttp. Normal muscular development. Pelvis stable. No obvious joint or bony deformity, no peripheral edema.   NEURO: Alert, follows commands. Weight bearing normal. Speech normal. Sensation and motor normal x4 extremities. CN2-12 normal, coordination normal, ambulating normally.  SKIN: Normal color for race, warm, dry and intact. No evidence of rash.  PSYCH: Normal mood and affect.

## 2021-07-13 NOTE — ED PROVIDER NOTE - NSFOLLOWUPINSTRUCTIONS_ED_ALL_ED_FT
No signs of emergency medical condition on today's workup.  Presumptive diagnosis made, but further evaluation may be required by your primary care doctor or specialist for a definitive diagnosis.  Therefore, follow up as directed and if symptoms change/worsen or any emergency conditions, please return to the ER.     Take your meclizine as prescribed    Follow up with your ENT doctor this week for reevaluation    Return for any new/worsening symptoms or any other concern    Follow up with a neurologist this week as discussed with the provided contact information.

## 2021-07-13 NOTE — ED PROVIDER NOTE - PATIENT PORTAL LINK FT
You can access the FollowMyHealth Patient Portal offered by [facility] by registering at the following website: [email or web address]. By joining [employer]’s FollowMyHealth portal, you will also be able to view your health information using other applications (apps) compatible with our system.

## 2021-07-14 LAB
CULTURE RESULTS: SIGNIFICANT CHANGE UP
SPECIMEN SOURCE: SIGNIFICANT CHANGE UP
TSH SERPL-MCNC: 2.02 UIU/ML — SIGNIFICANT CHANGE UP (ref 0.27–4.2)

## 2021-07-15 ENCOUNTER — APPOINTMENT (OUTPATIENT)
Dept: NEUROLOGY | Facility: CLINIC | Age: 31
End: 2021-07-15
Payer: COMMERCIAL

## 2021-07-15 VITALS
WEIGHT: 155 LBS | HEIGHT: 61 IN | DIASTOLIC BLOOD PRESSURE: 77 MMHG | SYSTOLIC BLOOD PRESSURE: 116 MMHG | HEART RATE: 83 BPM | BODY MASS INDEX: 29.27 KG/M2

## 2021-07-15 DIAGNOSIS — R42 DIZZINESS AND GIDDINESS: ICD-10-CM

## 2021-07-15 DIAGNOSIS — G57.02 LESION OF SCIATIC NERVE, LEFT LOWER LIMB: ICD-10-CM

## 2021-07-15 PROCEDURE — 99204 OFFICE O/P NEW MOD 45 MIN: CPT

## 2021-07-15 NOTE — HISTORY OF PRESENT ILLNESS
[FreeTextEntry1] : 30-year-old woman has had 6 days of vertigo associated with her right-sided head pressure.  She went to the [facility] emergency room 2 days ago and was referred to my office.  She continues to have the vertiginous with changes of head position.  Emergency room gave her a prescription for meclizine which causes fatigue.\par \par She has a history of left-sided sciatica which is intermittently present.

## 2021-07-15 NOTE — PHYSICAL EXAM
[FreeTextEntry1] : No cognitive or communication deficits.  [place]-Hallpike maneuver elicits vertigo with left ear down.  Epley maneuver was only of temporary benefit.  She again became vertiginous upon sitting up.  No hearing loss.  Right tympanic membrane obscured by cerumen.  Left TM intact and glistening.  No hearing loss.  Remainder of cranial nerves intact.  Neck is supple.  No weakness or atrophy of the limbs.  Tendon reflexes all active and symmetric.  No pain with straight leg raising.  She has discomfort with stretching of the left piriformis.

## 2021-07-31 ENCOUNTER — APPOINTMENT (OUTPATIENT)
Dept: MRI IMAGING | Facility: IMAGING CENTER | Age: 31
End: 2021-07-31
Payer: COMMERCIAL

## 2021-07-31 ENCOUNTER — OUTPATIENT (OUTPATIENT)
Dept: OUTPATIENT SERVICES | Facility: HOSPITAL | Age: 31
LOS: 1 days | End: 2021-07-31
Payer: COMMERCIAL

## 2021-07-31 DIAGNOSIS — R42 DIZZINESS AND GIDDINESS: ICD-10-CM

## 2021-07-31 PROCEDURE — 70551 MRI BRAIN STEM W/O DYE: CPT | Mod: 26

## 2021-07-31 PROCEDURE — 70551 MRI BRAIN STEM W/O DYE: CPT

## 2021-08-04 ENCOUNTER — NON-APPOINTMENT (OUTPATIENT)
Age: 31
End: 2021-08-04

## 2021-09-30 ENCOUNTER — APPOINTMENT (OUTPATIENT)
Dept: NEUROLOGY | Facility: CLINIC | Age: 31
End: 2021-09-30

## 2022-01-05 NOTE — REVIEW OF SYSTEMS
- - - [EDS: ESS=____] : daytime somnolence: ESS=[unfilled] [Snoring] : snoring [Witnessed Apneas] : witnessed apnea [Postnasal Drip] : postnasal drip [A.M. Dry Mouth] : a.m. dry mouth [Shortness Of Breath] : shortness of breath [Palpitations] : palpitations [Obesity] : obesity [Thyroid Disease] : thyroid disease [Anxious] : anxious [Difficulty Initiating Sleep] : difficulty falling asleep [Difficulty Maintaining Sleep] : difficulty maintaining sleep [Nasal Congestion] : no nasal congestion [Chest Pain] : no chest pain [Edema] : ~T edema was not present [CHF] : no congestive heart failure [Diabetes] : no diabetes  [Anemia] : no anemia [History of Iron Deficiency] : no history of iron deficiency [Leg Dysesthesias] : no leg dysesthesias [A.M. Headache] : no headache present upon awakening [Nocturia] : no nocturia [Heartburn] : no heartburn [Fibromyalgia] : no fibromyalgia [Depression] : no depression [Arthralgias] : no arthralgias [Lower Extremity Discomfort] : no lower extremity discomfort [Unusual Sleep Behavior] : no unusual sleep behavior [Hypersomnolence] : not sleeping much more than usual

## 2022-10-24 ENCOUNTER — RESULT REVIEW (OUTPATIENT)
Age: 32
End: 2022-10-24

## 2023-01-20 ENCOUNTER — APPOINTMENT (OUTPATIENT)
Dept: VASCULAR SURGERY | Facility: CLINIC | Age: 33
End: 2023-01-20
Payer: COMMERCIAL

## 2023-01-20 VITALS
SYSTOLIC BLOOD PRESSURE: 110 MMHG | HEART RATE: 69 BPM | WEIGHT: 155 LBS | DIASTOLIC BLOOD PRESSURE: 77 MMHG | HEIGHT: 61 IN | TEMPERATURE: 98 F | BODY MASS INDEX: 29.27 KG/M2

## 2023-01-20 PROCEDURE — 93970 EXTREMITY STUDY: CPT

## 2023-01-20 PROCEDURE — 99203 OFFICE O/P NEW LOW 30 MIN: CPT

## 2023-01-26 ENCOUNTER — NON-APPOINTMENT (OUTPATIENT)
Age: 33
End: 2023-01-26

## 2023-02-07 ENCOUNTER — APPOINTMENT (OUTPATIENT)
Dept: VASCULAR SURGERY | Facility: CLINIC | Age: 33
End: 2023-02-07

## 2023-02-13 ENCOUNTER — APPOINTMENT (OUTPATIENT)
Dept: VASCULAR SURGERY | Facility: CLINIC | Age: 33
End: 2023-02-13

## 2023-03-14 ENCOUNTER — APPOINTMENT (OUTPATIENT)
Dept: VASCULAR SURGERY | Facility: CLINIC | Age: 33
End: 2023-03-14

## 2023-05-20 ENCOUNTER — RX RENEWAL (OUTPATIENT)
Age: 33
End: 2023-05-20

## 2023-08-08 ENCOUNTER — RX RENEWAL (OUTPATIENT)
Age: 33
End: 2023-08-08

## 2024-05-01 ENCOUNTER — EMERGENCY (EMERGENCY)
Facility: HOSPITAL | Age: 34
LOS: 1 days | Discharge: HOME | End: 2024-05-01
Admitting: EMERGENCY MEDICINE
Payer: COMMERCIAL

## 2024-05-01 VITALS
TEMPERATURE: 99 F | SYSTOLIC BLOOD PRESSURE: 127 MMHG | DIASTOLIC BLOOD PRESSURE: 88 MMHG | RESPIRATION RATE: 17 BRPM | OXYGEN SATURATION: 97 % | HEART RATE: 107 BPM

## 2024-05-01 LAB
BASOPHILS # BLD AUTO: 0.03 K/UL — SIGNIFICANT CHANGE UP (ref 0–0.2)
BASOPHILS NFR BLD AUTO: 0.3 % — SIGNIFICANT CHANGE UP (ref 0–2)
EOSINOPHIL # BLD AUTO: 0.35 K/UL — SIGNIFICANT CHANGE UP (ref 0–0.5)
EOSINOPHIL NFR BLD AUTO: 4.1 % — SIGNIFICANT CHANGE UP (ref 0–6)
HCT VFR BLD CALC: 36.4 % — SIGNIFICANT CHANGE UP (ref 34.5–45)
HGB BLD-MCNC: 11.8 G/DL — SIGNIFICANT CHANGE UP (ref 11.5–15.5)
IANC: 5.2 K/UL — SIGNIFICANT CHANGE UP (ref 1.8–7.4)
IMM GRANULOCYTES NFR BLD AUTO: 0.2 % — SIGNIFICANT CHANGE UP (ref 0–0.9)
LYMPHOCYTES # BLD AUTO: 2.09 K/UL — SIGNIFICANT CHANGE UP (ref 1–3.3)
LYMPHOCYTES # BLD AUTO: 24.3 % — SIGNIFICANT CHANGE UP (ref 13–44)
MCHC RBC-ENTMCNC: 27.2 PG — SIGNIFICANT CHANGE UP (ref 27–34)
MCHC RBC-ENTMCNC: 32.4 GM/DL — SIGNIFICANT CHANGE UP (ref 32–36)
MCV RBC AUTO: 83.9 FL — SIGNIFICANT CHANGE UP (ref 80–100)
MONOCYTES # BLD AUTO: 0.9 K/UL — SIGNIFICANT CHANGE UP (ref 0–0.9)
MONOCYTES NFR BLD AUTO: 10.5 % — SIGNIFICANT CHANGE UP (ref 2–14)
NEUTROPHILS # BLD AUTO: 5.2 K/UL — SIGNIFICANT CHANGE UP (ref 1.8–7.4)
NEUTROPHILS NFR BLD AUTO: 60.6 % — SIGNIFICANT CHANGE UP (ref 43–77)
NRBC # BLD: 0 /100 WBCS — SIGNIFICANT CHANGE UP (ref 0–0)
NRBC # FLD: 0 K/UL — SIGNIFICANT CHANGE UP (ref 0–0)
PLATELET # BLD AUTO: 486 K/UL — HIGH (ref 150–400)
RBC # BLD: 4.34 M/UL — SIGNIFICANT CHANGE UP (ref 3.8–5.2)
RBC # FLD: 13.2 % — SIGNIFICANT CHANGE UP (ref 10.3–14.5)
WBC # BLD: 8.59 K/UL — SIGNIFICANT CHANGE UP (ref 3.8–10.5)
WBC # FLD AUTO: 8.59 K/UL — SIGNIFICANT CHANGE UP (ref 3.8–10.5)

## 2024-05-01 PROCEDURE — 99284 EMERGENCY DEPT VISIT MOD MDM: CPT

## 2024-05-01 RX ORDER — IPRATROPIUM/ALBUTEROL SULFATE 18-103MCG
3 AEROSOL WITH ADAPTER (GRAM) INHALATION ONCE
Refills: 0 | Status: COMPLETED | OUTPATIENT
Start: 2024-05-01 | End: 2024-05-01

## 2024-05-01 RX ORDER — SODIUM CHLORIDE 9 MG/ML
1000 INJECTION INTRAMUSCULAR; INTRAVENOUS; SUBCUTANEOUS ONCE
Refills: 0 | Status: COMPLETED | OUTPATIENT
Start: 2024-05-01 | End: 2024-05-01

## 2024-05-01 RX ORDER — ACETAMINOPHEN 500 MG
1000 TABLET ORAL ONCE
Refills: 0 | Status: COMPLETED | OUTPATIENT
Start: 2024-05-01 | End: 2024-05-01

## 2024-05-01 RX ORDER — KETOROLAC TROMETHAMINE 30 MG/ML
15 SYRINGE (ML) INJECTION ONCE
Refills: 0 | Status: DISCONTINUED | OUTPATIENT
Start: 2024-05-01 | End: 2024-05-01

## 2024-05-01 RX ADMIN — Medication 400 MILLIGRAM(S): at 23:33

## 2024-05-01 RX ADMIN — SODIUM CHLORIDE 1000 MILLILITER(S): 9 INJECTION INTRAMUSCULAR; INTRAVENOUS; SUBCUTANEOUS at 23:34

## 2024-05-01 RX ADMIN — Medication 3 MILLILITER(S): at 23:34

## 2024-05-01 RX ADMIN — Medication 15 MILLIGRAM(S): at 23:37

## 2024-05-01 NOTE — ED ADULT NURSE NOTE - OBJECTIVE STATEMENT
pt c/o flu like symptoms x 2 days. endorsing sick contacts. 20g iv placed to left ac. medicated per order.

## 2024-05-01 NOTE — ED ADULT NURSE NOTE - PAIN: PRESENCE, MLM
Received call from [facility] at [facility] Sunday), tessalon perles    LMP: NA Pregnant: NA    Recommended disposition: Go to [facility], was told walk in/[facility] if no appointments. Care advice provided, patient verbalizes understanding; denies any other questions or concerns; instructed to call back for any new or worsening symptoms. Patient/Caller agrees with recommended disposition; writer provided warm transfer to [facility] at [facility] for appointment scheduling    Attention Provider: Thank you for allowing me to participate in the care of your patient. The patient was connected to triage in response to information provided to the [facility]. Please do not respond through this encounter as the response is not directed to a shared pool.         Reason for Disposition   MILD difficulty breathing (e.g., minimal/no SOB at rest, SOB with walking, pulse < 100) of new-onset or worse than normal    Protocols used: BREATHING DIFFICULTY-ADULT-[place] complains of pain/discomfort

## 2024-05-02 VITALS
RESPIRATION RATE: 18 BRPM | TEMPERATURE: 98 F | DIASTOLIC BLOOD PRESSURE: 63 MMHG | HEART RATE: 75 BPM | SYSTOLIC BLOOD PRESSURE: 101 MMHG | OXYGEN SATURATION: 100 %

## 2024-05-02 LAB
ALBUMIN SERPL ELPH-MCNC: 4.1 G/DL — SIGNIFICANT CHANGE UP (ref 3.3–5)
ALP SERPL-CCNC: 93 U/L — SIGNIFICANT CHANGE UP (ref 40–120)
ALT FLD-CCNC: 17 U/L — SIGNIFICANT CHANGE UP (ref 4–33)
ANION GAP SERPL CALC-SCNC: 12 MMOL/L — SIGNIFICANT CHANGE UP (ref 7–14)
AST SERPL-CCNC: 15 U/L — SIGNIFICANT CHANGE UP (ref 4–32)
BILIRUB SERPL-MCNC: <0.2 MG/DL — SIGNIFICANT CHANGE UP (ref 0.2–1.2)
BUN SERPL-MCNC: 7 MG/DL — SIGNIFICANT CHANGE UP (ref 7–23)
CALCIUM SERPL-MCNC: 8.7 MG/DL — SIGNIFICANT CHANGE UP (ref 8.4–10.5)
CHLORIDE SERPL-SCNC: 103 MMOL/L — SIGNIFICANT CHANGE UP (ref 98–107)
CO2 SERPL-SCNC: 24 MMOL/L — SIGNIFICANT CHANGE UP (ref 22–31)
CREAT SERPL-MCNC: 0.59 MG/DL — SIGNIFICANT CHANGE UP (ref 0.5–1.3)
EGFR: 121 ML/MIN/1.73M2 — SIGNIFICANT CHANGE UP
FLUAV AG NPH QL: SIGNIFICANT CHANGE UP
FLUBV AG NPH QL: SIGNIFICANT CHANGE UP
GLUCOSE SERPL-MCNC: 105 MG/DL — HIGH (ref 70–99)
HCG SERPL-ACNC: <1 MIU/ML — SIGNIFICANT CHANGE UP
POTASSIUM SERPL-MCNC: 4.2 MMOL/L — SIGNIFICANT CHANGE UP (ref 3.5–5.3)
POTASSIUM SERPL-SCNC: 4.2 MMOL/L — SIGNIFICANT CHANGE UP (ref 3.5–5.3)
PROT SERPL-MCNC: 7.9 G/DL — SIGNIFICANT CHANGE UP (ref 6–8.3)
RSV RNA NPH QL NAA+NON-PROBE: SIGNIFICANT CHANGE UP
SARS-COV-2 RNA SPEC QL NAA+PROBE: SIGNIFICANT CHANGE UP
SODIUM SERPL-SCNC: 139 MMOL/L — SIGNIFICANT CHANGE UP (ref 135–145)
TSH SERPL-MCNC: 7.15 UIU/ML — HIGH (ref 0.27–4.2)

## 2024-05-02 PROCEDURE — 71046 X-RAY EXAM CHEST 2 VIEWS: CPT | Mod: 26

## 2024-05-02 RX ORDER — PSEUDOEPHEDRINE HCL 30 MG
60 TABLET ORAL ONCE
Refills: 0 | Status: COMPLETED | OUTPATIENT
Start: 2024-05-02 | End: 2024-05-02

## 2024-05-02 RX ADMIN — Medication 60 MILLIGRAM(S): at 01:55

## 2024-05-02 NOTE — ED PROVIDER NOTE - PATIENT PORTAL LINK FT
You can access the FollowMyHealth Patient Portal offered by [facility] by registering at the following website: [email or web address]. By joining [employer]’s FollowMyHealth portal, you will also be able to view your health information using other applications (apps) compatible with our system.

## 2024-05-02 NOTE — ED PROVIDER NOTE - PROGRESS NOTE DETAILS
LAINA Don: Labs reviewed, WNL, TSH elevated but has known hx of hypothyroidism. CXR neg. Pt notes she has facial pressure congestion, Sudafed ordered. Pt counseled regarding signs/symptoms warranting return, all questions answered she verbalized understanding.

## 2024-05-02 NOTE — ED PROVIDER NOTE - PHYSICAL EXAMINATION
CONSTITUTIONAL: ill-appearing; well-nourished; in no apparent distress. Non-toxic appearing.   NEURO: Alert & oriented. Gait steady without assistance. Sensory and motor functions are grossly intact.  PSYCH: Mood appropriate. Thought processes intact.   ENT: EACs are without edema, erythema, or tenderness on speculum exam. TMs pearly b/l w/mild erythema and clear fluid behind TMs b/l, no bulging. Nose midline, (+)  inferior turbinate swelling b/l, no rhinorrhea. Posterior pharynx is mildly erythematous. No tonsil edema or exudates. Uvula midline.   NECK: Supple. No adenopathy.   CARD: Regular rate and rhythm, no murmurs  RESP: No accessory muscle use; breath sounds clear and equal bilaterally; no wheezes, rhonchi, or rales     ABD: Soft; non-distended; non-tender. No guarding or rebound.   MUSCULOSKELETAL/EXTREMITIES: FROM in all four extremities; no extremity edema.  SKIN: Warm; dry; no apparent lesions or exudate

## 2024-05-14 ENCOUNTER — RX RENEWAL (OUTPATIENT)
Age: 34
End: 2024-05-14

## 2024-09-04 NOTE — ED PROVIDER NOTE - NS ED NOTE AC HIGH RISK COUNTRIES
[FreeTextEntry1] : History of urinary incontinence recently started on Myrbetriq 25 mg daily.  Also has BPH.  Worsening dementia.
No

## 2025-01-03 ENCOUNTER — RX RENEWAL (OUTPATIENT)
Age: 35
End: 2025-01-03

## 2025-01-12 ENCOUNTER — EMERGENCY (EMERGENCY)
Facility: HOSPITAL | Age: 35
LOS: 1 days | Discharge: HOME | End: 2025-01-12
Attending: EMERGENCY MEDICINE | Admitting: EMERGENCY MEDICINE
Payer: COMMERCIAL

## 2025-01-12 VITALS
TEMPERATURE: 98 F | DIASTOLIC BLOOD PRESSURE: 72 MMHG | SYSTOLIC BLOOD PRESSURE: 108 MMHG | RESPIRATION RATE: 16 BRPM | HEART RATE: 91 BPM | OXYGEN SATURATION: 100 % | HEIGHT: 61 IN | WEIGHT: 160.06 LBS

## 2025-01-12 VITALS
TEMPERATURE: 98 F | OXYGEN SATURATION: 98 % | SYSTOLIC BLOOD PRESSURE: 104 MMHG | DIASTOLIC BLOOD PRESSURE: 74 MMHG | RESPIRATION RATE: 16 BRPM | HEART RATE: 72 BPM

## 2025-01-12 PROCEDURE — 99284 EMERGENCY DEPT VISIT MOD MDM: CPT

## 2025-01-12 PROCEDURE — 76801 OB US < 14 WKS SINGLE FETUS: CPT | Mod: 26

## 2025-01-12 PROCEDURE — 93971 EXTREMITY STUDY: CPT | Mod: 26,LT

## 2025-01-12 RX ORDER — ACETAMINOPHEN 80 MG/.8ML
650 SOLUTION/ DROPS ORAL ONCE
Refills: 0 | Status: COMPLETED | OUTPATIENT
Start: 2025-01-12 | End: 2025-01-12

## 2025-01-12 RX ADMIN — ACETAMINOPHEN 650 MILLIGRAM(S): 80 SOLUTION/ DROPS ORAL at 10:45

## 2025-01-12 NOTE — ED ADULT TRIAGE NOTE - CHIEF COMPLAINT QUOTE
pt states im 2 months pregnant and I have varicose veins  this morning woke with severe pain in rt mid leg  gyn sent pt in for an ultra sound r/o clot

## 2025-01-12 NOTE — ED PROVIDER NOTE - PATIENT PORTAL LINK FT
You can access the FollowMyHealth Patient Portal offered by [facility] by registering at the following website: [email or web address]. By joining [employer]’s FollowMyHealth portal, you will also be able to view your health information using other applications (apps) compatible with our system.

## 2025-01-12 NOTE — ED ADULT NURSE NOTE - OBJECTIVE STATEMENT
34 year old female, received to intake. A&Ox4, ambulatory. Respirations equal and unlabored. Denies past medical history of vertigo and hypothyroidism. Pt c/o right calf pain since this morning. Pt states it is worse when she ambulates. Pt also states she is 2 months pregnant, confirmed IUP. Denies urinary symptoms, vaginal bleeding or discharge, chest pain, SOB, palpitations, dizziness, blurry vision, numbness, tingling, n/v/d. Medicated as per EMR orders. Pending US. No acute distress noted. Safety maintained.

## 2025-01-12 NOTE — ED ADULT NURSE REASSESSMENT NOTE - NS ED NURSE REASSESS COMMENT FT1
pt alert,oriented x3. reports swelling  to r leg-  with pain upon walking. awaits ultrasound.  pt 2 mos pregnant. will continue to monitor

## 2025-01-12 NOTE — ED PROVIDER NOTE - CLINICAL SUMMARY MEDICAL DECISION MAKING FREE TEXT BOX
35 y/o female with h/o prior dvt, currently 2 months preg, here with r calf pain.  Eval for dvt.  Obtain tvus u/s venous duplex rle give acetaminophen reassess.

## 2025-01-12 NOTE — ED PROVIDER NOTE - OBJECTIVE STATEMENT
35 y/o female h/o dvt no longer on ac, hypothyroidism 2 months preg sent by ob for dvt r/o.  Pt woke up with rle pain today at calf.  Has known varicose veins she is unsure if that is what is causing the discomfort.  Denies cp sob palpitations syncope abd pain vag bld or dc.

## 2025-01-24 NOTE — ED ADULT NURSE NOTE - CAS EDN DISCHARGE INTERVENTIONS
Outreach made to patient regarding nurse visit needing to be rescheduled. Spoke to patient who advised he already removed his sutures and didn't need to schedule his appt. Patient advised to continue wound care as discussed at appt and to call if any signs of infection. Provider notified.   By MD/IV discontinued, cath removed intact

## 2025-02-10 ENCOUNTER — ASOB RESULT (OUTPATIENT)
Age: 35
End: 2025-02-10

## 2025-02-10 ENCOUNTER — APPOINTMENT (OUTPATIENT)
Dept: ANTEPARTUM | Facility: CLINIC | Age: 35
End: 2025-02-10

## 2025-02-10 PROCEDURE — 76801 OB US < 14 WKS SINGLE FETUS: CPT

## 2025-02-10 PROCEDURE — 76813 OB US NUCHAL MEAS 1 GEST: CPT

## 2025-02-20 DIAGNOSIS — R00.2 PALPITATIONS: ICD-10-CM

## 2025-02-20 RX ORDER — ASPIRIN ENTERIC COATED TABLETS 81 MG 81 MG/1
81 TABLET, DELAYED RELEASE ORAL DAILY
Refills: 0 | Status: ACTIVE | COMMUNITY

## 2025-02-20 RX ORDER — PRENATAL VIT 49/IRON FUM/FOLIC 6.75-0.2MG
TABLET ORAL DAILY
Refills: 0 | Status: ACTIVE | COMMUNITY

## 2025-02-25 ENCOUNTER — APPOINTMENT (OUTPATIENT)
Dept: CARDIOLOGY | Facility: CLINIC | Age: 35
End: 2025-02-25
Payer: COMMERCIAL

## 2025-02-25 VITALS
OXYGEN SATURATION: 100 % | BODY MASS INDEX: 30.78 KG/M2 | HEART RATE: 81 BPM | WEIGHT: 163 LBS | SYSTOLIC BLOOD PRESSURE: 130 MMHG | HEIGHT: 61 IN | DIASTOLIC BLOOD PRESSURE: 77 MMHG

## 2025-02-25 DIAGNOSIS — Z86.39 PERSONAL HISTORY OF OTHER ENDOCRINE, NUTRITIONAL AND METABOLIC DISEASE: ICD-10-CM

## 2025-02-25 DIAGNOSIS — R00.2 PALPITATIONS: ICD-10-CM

## 2025-02-25 PROCEDURE — 93242 EXT ECG>48HR<7D RECORDING: CPT

## 2025-02-25 PROCEDURE — 93000 ELECTROCARDIOGRAM COMPLETE: CPT

## 2025-02-25 PROCEDURE — 99205 OFFICE O/P NEW HI 60 MIN: CPT | Mod: 25

## 2025-04-08 ENCOUNTER — ASOB RESULT (OUTPATIENT)
Age: 35
End: 2025-04-08

## 2025-04-08 ENCOUNTER — APPOINTMENT (OUTPATIENT)
Dept: ANTEPARTUM | Facility: CLINIC | Age: 35
End: 2025-04-08
Payer: COMMERCIAL

## 2025-04-08 PROCEDURE — 76811 OB US DETAILED SNGL FETUS: CPT

## 2025-04-23 ENCOUNTER — ASOB RESULT (OUTPATIENT)
Age: 35
End: 2025-04-23

## 2025-04-23 ENCOUNTER — APPOINTMENT (OUTPATIENT)
Dept: ANTEPARTUM | Facility: CLINIC | Age: 35
End: 2025-04-23
Payer: COMMERCIAL

## 2025-04-23 PROCEDURE — 76816 OB US FOLLOW-UP PER FETUS: CPT

## 2025-05-23 ENCOUNTER — NON-APPOINTMENT (OUTPATIENT)
Age: 35
End: 2025-05-23

## 2025-05-30 ENCOUNTER — APPOINTMENT (OUTPATIENT)
Dept: MATERNAL FETAL MEDICINE | Facility: CLINIC | Age: 35
End: 2025-05-30

## 2025-05-30 ENCOUNTER — ASOB RESULT (OUTPATIENT)
Age: 35
End: 2025-05-30

## 2025-05-30 DIAGNOSIS — O24.419 GESTATIONAL DIABETES MELLITUS IN PREGNANCY, UNSPECIFIED CONTROL: ICD-10-CM

## 2025-05-30 PROCEDURE — G0109 DIAB MANAGE TRN IND/GROUP: CPT | Mod: 95

## 2025-05-30 RX ORDER — LANCETS 33 GAUGE
EACH MISCELLANEOUS
Qty: 2 | Refills: 2 | Status: ACTIVE | COMMUNITY
Start: 2025-05-30 | End: 1900-01-01

## 2025-05-30 RX ORDER — BLOOD-GLUCOSE METER
KIT MISCELLANEOUS
Qty: 2 | Refills: 2 | Status: ACTIVE | COMMUNITY
Start: 2025-05-30 | End: 1900-01-01

## 2025-05-30 RX ORDER — URINE ACETONE TEST STRIPS
STRIP MISCELLANEOUS
Qty: 2 | Refills: 2 | Status: ACTIVE | COMMUNITY
Start: 2025-05-30 | End: 1900-01-01

## 2025-05-30 RX ORDER — BLOOD-GLUCOSE METER
W/DEVICE KIT MISCELLANEOUS
Qty: 1 | Refills: 0 | Status: ACTIVE | COMMUNITY
Start: 2025-05-30 | End: 1900-01-01

## 2025-06-10 ENCOUNTER — ASOB RESULT (OUTPATIENT)
Age: 35
End: 2025-06-10

## 2025-06-10 ENCOUNTER — APPOINTMENT (OUTPATIENT)
Dept: MATERNAL FETAL MEDICINE | Facility: CLINIC | Age: 35
End: 2025-06-10

## 2025-06-10 PROCEDURE — G0108 DIAB MANAGE TRN  PER INDIV: CPT | Mod: 95

## 2025-06-11 ENCOUNTER — NON-APPOINTMENT (OUTPATIENT)
Age: 35
End: 2025-06-11

## 2025-06-17 ENCOUNTER — NON-APPOINTMENT (OUTPATIENT)
Age: 35
End: 2025-06-17

## 2025-06-26 ENCOUNTER — APPOINTMENT (OUTPATIENT)
Dept: MATERNAL FETAL MEDICINE | Facility: CLINIC | Age: 35
End: 2025-06-26

## 2025-06-26 ENCOUNTER — ASOB RESULT (OUTPATIENT)
Age: 35
End: 2025-06-26

## 2025-06-26 PROBLEM — O24.415 GESTATIONAL DIABETES MELLITUS (GDM) IN THIRD TRIMESTER CONTROLLED ON ORAL HYPOGLYCEMIC DRUG: Status: ACTIVE | Noted: 2025-06-26

## 2025-06-26 PROCEDURE — G0108 DIAB MANAGE TRN  PER INDIV: CPT | Mod: 95

## 2025-06-26 PROCEDURE — 99204 OFFICE O/P NEW MOD 45 MIN: CPT | Mod: 95

## 2025-06-26 RX ORDER — METFORMIN ER 500 MG 500 MG/1
500 TABLET ORAL
Qty: 1 | Refills: 0 | Status: ACTIVE | COMMUNITY
Start: 2025-06-26 | End: 1900-01-01

## 2025-06-27 ENCOUNTER — NON-APPOINTMENT (OUTPATIENT)
Age: 35
End: 2025-06-27

## 2025-07-15 ENCOUNTER — APPOINTMENT (OUTPATIENT)
Dept: ANTEPARTUM | Facility: CLINIC | Age: 35
End: 2025-07-15

## 2025-07-15 ENCOUNTER — ASOB RESULT (OUTPATIENT)
Age: 35
End: 2025-07-15

## 2025-07-15 ENCOUNTER — APPOINTMENT (OUTPATIENT)
Dept: MATERNAL FETAL MEDICINE | Facility: CLINIC | Age: 35
End: 2025-07-15
Payer: COMMERCIAL

## 2025-07-15 ENCOUNTER — INPATIENT (INPATIENT)
Facility: HOSPITAL | Age: 35
LOS: 0 days | Discharge: HOME | End: 2025-07-16
Attending: OBSTETRICS & GYNECOLOGY | Admitting: OBSTETRICS & GYNECOLOGY

## 2025-07-15 VITALS
TEMPERATURE: 98 F | SYSTOLIC BLOOD PRESSURE: 119 MMHG | HEART RATE: 101 BPM | OXYGEN SATURATION: 100 % | DIASTOLIC BLOOD PRESSURE: 80 MMHG | RESPIRATION RATE: 14 BRPM
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
## 2025-07-15 DIAGNOSIS — O26.899 OTHER SPECIFIED PREGNANCY RELATED CONDITIONS, UNSPECIFIED TRIMESTER: ICD-10-CM

## 2025-07-15 LAB
APTT BLD: 25.8 SEC — LOW (ref 26.1–36.8)
BLD GP AB SCN SERPL QL: NEGATIVE — SIGNIFICANT CHANGE UP
FIBRINOGEN PPP-MCNC: 395 MG/DL — SIGNIFICANT CHANGE UP (ref 200–465)
GLUCOSE BLDC GLUCOMTR-MCNC: 65 MG/DL — LOW (ref 70–99)
HCT VFR BLD CALC: 38.8 % — SIGNIFICANT CHANGE UP (ref 34.5–45)
HGB BLD-MCNC: 12.8 G/DL — SIGNIFICANT CHANGE UP (ref 11.5–15.5)
INR BLD: <0.9 RATIO — SIGNIFICANT CHANGE UP (ref 0.85–1.16)
MCHC RBC-ENTMCNC: 29.8 PG — SIGNIFICANT CHANGE UP (ref 27–34)
MCHC RBC-ENTMCNC: 33 G/DL — SIGNIFICANT CHANGE UP (ref 32–36)
MCV RBC AUTO: 90.4 FL — SIGNIFICANT CHANGE UP (ref 80–100)
NRBC # BLD AUTO: 0 K/UL — SIGNIFICANT CHANGE UP (ref 0–0)
NRBC # FLD: 0 K/UL — SIGNIFICANT CHANGE UP (ref 0–0)
NRBC BLD AUTO-RTO: 0 /100 WBCS — SIGNIFICANT CHANGE UP (ref 0–0)
PLATELET # BLD AUTO: 440 K/UL — HIGH (ref 150–400)
PMV BLD: 10.6 FL — SIGNIFICANT CHANGE UP (ref 7–13)
PROTHROM AB SERPL-ACNC: 10.3 SEC — SIGNIFICANT CHANGE UP (ref 9.9–13.4)
RBC # BLD: 4.29 M/UL — SIGNIFICANT CHANGE UP (ref 3.8–5.2)
RBC # FLD: 13.7 % — SIGNIFICANT CHANGE UP (ref 10.3–14.5)
RH IG SCN BLD-IMP: POSITIVE — SIGNIFICANT CHANGE UP
WBC # BLD: 10.43 K/UL — SIGNIFICANT CHANGE UP (ref 3.8–10.5)
WBC # FLD AUTO: 10.43 K/UL — SIGNIFICANT CHANGE UP (ref 3.8–10.5)

## 2025-07-15 PROCEDURE — G0108 DIAB MANAGE TRN  PER INDIV: CPT | Mod: 95

## 2025-07-15 RX ORDER — SODIUM CHLORIDE 9 G/1000ML
1000 INJECTION, SOLUTION INTRAVENOUS
Refills: 0 | Status: DISCONTINUED | OUTPATIENT
Start: 2025-07-15 | End: 2025-07-15

## 2025-07-15 RX ORDER — PRENATAL 136/IRON/FOLIC ACID 27 MG-1 MG
0 TABLET ORAL
Refills: 0 | DISCHARGE

## 2025-07-15 RX ORDER — ASPIRIN 325 MG
1 TABLET ORAL
Refills: 0 | DISCHARGE

## 2025-07-15 RX ORDER — OXYTOCIN-SODIUM CHLORIDE 0.9% IV SOLN 30 UNIT/500ML 30-0.9/5 UT/ML-%
167 SOLUTION INTRAVENOUS
Qty: 30 | Refills: 0 | Status: DISCONTINUED | OUTPATIENT
Start: 2025-07-15 | End: 2025-07-15

## 2025-07-15 RX ORDER — CITRIC ACID/SODIUM CITRATE 300-500 MG
15 SOLUTION, ORAL ORAL EVERY 6 HOURS
Refills: 0 | Status: DISCONTINUED | OUTPATIENT
Start: 2025-07-15 | End: 2025-07-15

## 2025-07-15 RX ADMIN — SODIUM CHLORIDE 125 MILLILITER(S): 9 INJECTION, SOLUTION INTRAVENOUS at 22:20

## 2025-07-15 NOTE — OB PROVIDER TRIAGE NOTE - HISTORY OF PRESENT ILLNESS
34 year old female P0 at 34.5 week gestation who presents s/p fall off stool ( with wheels)  at 1345p  and landed on buttocks and denied any abd trauma  denied any head trauma     denied any LOF VB    denied any contractions  pelvic pain   denied any other body pain since the fall   feels good FM       PNC   WFW  Dr Borges    stated  GDMA 2  was prescribed metformin recently   but did not start  stated diet control and stated FS are well controlled

## 2025-07-15 NOTE — OB PROVIDER H&P - NS ATTEND AMEND GEN_ALL_CORE FT
Attending Note   Agree with above   pt had trauma falling off the stool   on NST pt is contraction   will admit for 24 hour observation

## 2025-07-15 NOTE — OB PROVIDER H&P - ASSESSMENT
34 year female P0 at 34.5 week gestation  s/p Fall without Abdominal trauma  with contractions for 24 hour monitoring  r/o abruption     case  d/w Dr Derrick Abel     admission orders placed    Risks, benefits, alternatives, and possible complications have been discussed in detail with the patient in her native language.   Pre-admission, admission, and post admission procedures and expectations were discussed in detail.   All questions answered, all appropriate hospital consents were signed. Anticipate normal vaginal delivery.    Informed consent was obtained. The following was discussed:   observation and monitoring s/p fall    abruption precautions discussed     KALLIE MARINA

## 2025-07-15 NOTE — OB RN PATIENT PROFILE - NSSDOHPLACELIVE_OBGYN_A_OB
ANTICOAGULATION FOLLOW-UP CLINIC VISIT    Patient Name:  Unique Ward  Date:  4/10/2020  Contact Type:  Telephone    SUBJECTIVE:  Patient Findings     Positives:   Change in medications (On day 2 of cipro - will keep next weeks appt)             OBJECTIVE    INR Point of Care   Date Value Ref Range Status   04/10/2020 2.2 (H) 0.86 - 1.14 Final     Comment:     This test is intended for monitoring Coumadin therapy.  Results are not   accurate in patients with prolonged INR due to factor deficiency.         ASSESSMENT / PLAN  INR assessment THER    Recheck INR In: 6 DAYS    INR Location Outside lab      Anticoagulation Summary  As of 4/10/2020    INR goal:   2.0-3.0   TTR:   85.5 % (9.9 mo)   INR used for dosin.2 (4/10/2020)   Warfarin maintenance plan:   10 mg (5 mg x 2) every Mon, Fri; 7.5 mg (5 mg x 1.5) all other days   Full warfarin instructions:   10 mg every Mon, Fri; 7.5 mg all other days   Weekly warfarin total:   57.5 mg   No change documented:   Veronika Campbell RN   Plan last modified:   Veronika Campbell RN (2020)   Next INR check:   2020   Priority:   Maintenance   Target end date:       Indications    Atrial fibrillation (H) [I48.91]             Anticoagulation Episode Summary     INR check location:       Preferred lab:       Send INR reminders to:   [facility] [place]    Comments:   5 mg tabs, PM dose, print out      Anticoagulation Care Providers     Provider Role Specialty Phone number    Tyler Lee MD [facility] [phone]            See the Encounter Report to view Anticoagulation Flowsheet and Dosing Calendar (Go to Encounters tab in chart review, and find the Anticoagulation Therapy Visit)    Dosage adjustment made based on physician directed care plan.    Veronika Campbell RN                  none of the above

## 2025-07-15 NOTE — OB RN PATIENT PROFILE - WEIGHT: TOTAL WEIGHT IN LBS
Outpatient Pharmacotherapy Program    Smoking Cessation Note    Reason for Visit: Patient presents for smoking cessation pharmacotherapy  Initial Visit    HPI:    Age at Which Started Smokin  Average number of cigarettes smoked per day: 10  Additional Smoking Hx: None  Pertinent Psych Hx:  depression and anxiety  Recent quit attempts: few months ago was diagnosed with aortic aneurysm and has cut back smoking since then previous smoking 30 cigarettes per day  Medications reviewed and reconciled   Patient tried to take buproprion - felt that it helped with urges to quit but made her extremely anxious and she is wanting to try alternative therapy    Vitals:  BP:   131/87         HR:  69    Assessment:    Tobacco use disorder, willing to make quit attempt with a combination of pharmacological and behavioral therapy.    Plan:    Behavioral Modification Recommended: Specifically [facility]oking Cessation Classes    Quit Date:  23    Nicotine Replacement Therapy  Chantix            Start Pack    Potential side-effects of all prescribed pharmacotherapy reviewed with patient who verbalizes understanding of the risks and benefits of this therapy.    Follow-up :  In pharmacotherapy clinic:  Date: 23    Amina Mahajan PharmD    cc:  Renae Tariq MD    
11

## 2025-07-15 NOTE — OB RN PATIENT PROFILE - THE IMPORTANCE OF THE NEWBORN'S COMFORT AND THERMOREGULATION DURING SKIN TO SKIN: ANY PART OF INFANT SKIN NOT TOUCHING PARENT'S SKIN IS TO BE COVERED BY A BLANKET.
You can access the FollowMyHealth Patient Portal offered by [facility] by registering at the following website: [email or web address]. By joining [employer]’s FollowMyHealth portal, you will also be able to view your health information using other applications (apps) compatible with our system. Statement Selected

## 2025-07-15 NOTE — OB RN PATIENT PROFILE - GRAVIDA, OB PROFILE
Goal Outcome Evaluation:  To Do:  End of Shift Summary  For vital signs and complete assessments, please see documentation flowsheets.     Pertinent assessments: Pt A/Ox4. VSS. Oxycodone,Tylenol and ice pack for left tooth pain. Up in.  Major Shift Events: uneventful  Treatment Plan: symptom management  Bedside Nurse: Renetta Montano RN                            1

## 2025-07-15 NOTE — OB PROVIDER H&P - NS_SONOPERFORMEDBY_OBGYN_ALL_OB_FT
DATE EEG PERFORMED:  2023.      DATE EEG INTERPRETED: 2023              DURATION OF EE MINUTES         LEVEL OF CONSCIOUSENESS    Awake and Sleep.         EEG BACKGROUND    The posterior dominant basic rhythm reaches 9-10 Hz, symmetric, reactive, well-modulated and well-sustained.         EEG CLASSIFICATION    Normal        IMPRESSION      The EEG is normal in the awake and sleep states.       There are no epileptiform discharges or lateralizing signs. No typical events were recorded. There is no electrographic evidence of seizure.There is no electrographic evidence of status epilepticus.         PLEASE NOTE THAT A NON-EPILEPTIFORM EEG DOES NOT RULE OUT EPILEPSY.        IAN BRISCOE MD, FAAN    Diplomate, American Board of Psychiatry and Neurology    Diplomate, American Board of Clinical Neurophysiology      KALLIE MARINA

## 2025-07-15 NOTE — OB PROVIDER TRIAGE NOTE - NSHPPHYSICALEXAM_GEN_ALL_CORE
ICU Vital Signs Last 24 Hrs  T(C): 36.9 (15 Jul 2025 16:10), Max: 36.9 (15 Jul 2025 15:45)  T(F): 98.42 (15 Jul 2025 16:10), Max: 98.42 (15 Jul 2025 16:10)  HR: 81 (15 Jul 2025 21:03) (65 - 101)  BP: 102/61 (15 Jul 2025 19:46) (102/61 - 119/80)  BP(mean): --  ABP: --  ABP(mean): --  RR: 14 (15 Jul 2025 16:10) (14 - 14)  SpO2: 100% (15 Jul 2025 21:03) (92% - 100%)    GENERAL: pt in  NAD,   HEAD:  Atraumatic, normocephalic  EYES: PERRLA, conjunctiva and sclera clear  NECK: Supple, nontender   HEART: Regular rate and rhythm,   LUNGS: Unlabored respirations.  Clear to auscultation bilaterally   ABDOMEN: Soft, nontender, gravid      VE l/c/p/-3   GBS done  chaperone Nanacy r n   scan  ABD  Vertex AAFI  BPP 8/8   posterior placenta     images saved to ASOB   EXTREMITIES: 2+ peripheral pulses bilaterally. No clubbing, cyanosis, or edema  NERVOUS SYSTEM:  A&Ox3, moving all extremities, no focal deficits   SKIN: No rashes or lesions  NST  Baseline  (         135 ) BPM  Variability ( x )  Moderate   (  ) Minimal  (  ) Absent  (  )  Marked  Accelerations (  x) 15x15   (  ) 10x10  (  ) no  Decelerations (x  ) no  (  ) Variable  (  ) Early  (  ) Late      Description _________  Contractions (  ) no  (  x) yes     Description  _____q1-4 minutes_____  Interpretation (  x) reactive   (  )  non-reactive

## 2025-07-15 NOTE — OB PROVIDER H&P - NSHPPHYSICALEXAM_GEN_ALL_CORE
ICU Vital Signs Last 24 Hrs  T(C): 36.9 (15 Jul 2025 16:10), Max: 36.9 (15 Jul 2025 15:45)  T(F): 98.42 (15 Jul 2025 16:10), Max: 98.42 (15 Jul 2025 16:10)  HR: 81 (15 Jul 2025 21:03) (65 - 101)  BP: 102/61 (15 Jul 2025 19:46) (102/61 - 119/80)  BP(mean): --  ABP: --  ABP(mean): --  RR: 14 (15 Jul 2025 16:10) (14 - 14)  SpO2: 100% (15 Jul 2025 21:03) (92% - 100%)    GENERAL: pt in  NAD,   HEAD:  Atraumatic, normocephalic  EYES:PERRLA, conjunctiva and sclera clear  NECK: Supple, nontender   HEART: Regular rate and rhythm,   LUNGS: Unlabored respirations.  Clear to auscultation bilaterally   ABDOMEN: Soft, nontender, gravid      VE l/c/p/-3   GBS done    scan  ABD  Vertex AAFI  BPP 8/8   posterior placenta     images saved to ASOB   EXTREMITIES: 2+ peripheral pulses bilaterally. No clubbing, cyanosis, or edema  NERVOUS SYSTEM:  A&Ox3, moving all extremities, no focal deficits   SKIN: No rashes or lesions ICU Vital Signs Last 24 Hrs  T(C): 36.9 (15 Jul 2025 16:10), Max: 36.9 (15 Jul 2025 15:45)  T(F): 98.42 (15 Jul 2025 16:10), Max: 98.42 (15 Jul 2025 16:10)  HR: 81 (15 Jul 2025 21:03) (65 - 101)  BP: 102/61 (15 Jul 2025 19:46) (102/61 - 119/80)  BP(mean): --  ABP: --  ABP(mean): --  RR: 14 (15 Jul 2025 16:10) (14 - 14)  SpO2: 100% (15 Jul 2025 21:03) (92% - 100%)    GENERAL: pt in  NAD,   HEAD:  Atraumatic, normocephalic  EYES: PERRLA, conjunctiva and sclera clear  NECK: Supple, nontender   HEART: Regular rate and rhythm,   LUNGS: Unlabored respirations.  Clear to auscultation bilaterally   ABDOMEN: Soft, nontender, gravid      VE l/c/p/-3   GBS done  chaperone Nanacy r n   scan  ABD  Vertex AAFI  BPP 8/8   posterior placenta     images saved to ASOB   EXTREMITIES: 2+ peripheral pulses bilaterally. No clubbing, cyanosis, or edema  NERVOUS SYSTEM:  A&Ox3, moving all extremities, no focal deficits   SKIN: No rashes or lesions  NST  Baseline  (         135 ) BPM  Variability ( x )  Moderate   (  ) Minimal  (  ) Absent  (  )  Marked  Accelerations (  x) 15x15   (  ) 10x10  (  ) no  Decelerations (x  ) no  (  ) Variable  (  ) Early  (  ) Late      Description _________  Contractions (  ) no  (  x) yes     Description  _____q1-4 minutes_____  Interpretation (  x) reactive   (  )  non-reactive

## 2025-07-15 NOTE — OB RN TRIAGE NOTE - NSICDXPASTMEDICALHX_GEN_ALL_CORE_FT
PAST MEDICAL HISTORY:  Hypothyroidism, unspecified type     Vertigo      PAST MEDICAL HISTORY:  Hypothyroidism, unspecified type     Postural orthostatic tachycardia syndrome [POTS]     Vertigo      08-Jan-2019 11:59

## 2025-07-15 NOTE — OB PROVIDER TRIAGE NOTE - NSICDXPASTMEDICALHX_GEN_ALL_CORE_FT
PAST MEDICAL HISTORY:  Hypothyroidism, unspecified type     Postural orthostatic tachycardia syndrome [POTS]     Vertigo

## 2025-07-16 ENCOUNTER — ASOB RESULT (OUTPATIENT)
Age: 35
End: 2025-07-16

## 2025-07-16 ENCOUNTER — TRANSCRIPTION ENCOUNTER (OUTPATIENT)
Age: 35
End: 2025-07-16

## 2025-07-16 ENCOUNTER — APPOINTMENT (OUTPATIENT)
Dept: ANTEPARTUM | Facility: CLINIC | Age: 35
End: 2025-07-16

## 2025-07-16 VITALS
RESPIRATION RATE: 16 BRPM | SYSTOLIC BLOOD PRESSURE: 96 MMHG | OXYGEN SATURATION: 99 % | TEMPERATURE: 98 F | HEART RATE: 96 BPM | DIASTOLIC BLOOD PRESSURE: 59 MMHG

## 2025-07-16 LAB
APTT BLD: 28.6 SEC — SIGNIFICANT CHANGE UP (ref 26.1–36.8)
GLUCOSE BLDC GLUCOMTR-MCNC: 79 MG/DL — SIGNIFICANT CHANGE UP (ref 70–99)
GLUCOSE BLDC GLUCOMTR-MCNC: 92 MG/DL — SIGNIFICANT CHANGE UP (ref 70–99)
GLUCOSE BLDC GLUCOMTR-MCNC: 93 MG/DL — SIGNIFICANT CHANGE UP (ref 70–99)
HCT VFR BLD CALC: 35.1 % — SIGNIFICANT CHANGE UP (ref 34.5–45)
HGB BLD-MCNC: 11.3 G/DL — LOW (ref 11.5–15.5)
INR BLD: 1 RATIO — SIGNIFICANT CHANGE UP (ref 0.85–1.16)
MCHC RBC-ENTMCNC: 28.9 PG — SIGNIFICANT CHANGE UP (ref 27–34)
MCHC RBC-ENTMCNC: 32.2 G/DL — SIGNIFICANT CHANGE UP (ref 32–36)
MCV RBC AUTO: 89.8 FL — SIGNIFICANT CHANGE UP (ref 80–100)
NRBC # BLD AUTO: 0 K/UL — SIGNIFICANT CHANGE UP (ref 0–0)
NRBC # FLD: 0 K/UL — SIGNIFICANT CHANGE UP (ref 0–0)
NRBC BLD AUTO-RTO: 0 /100 WBCS — SIGNIFICANT CHANGE UP (ref 0–0)
PLATELET # BLD AUTO: 400 K/UL — SIGNIFICANT CHANGE UP (ref 150–400)
PMV BLD: 9.9 FL — SIGNIFICANT CHANGE UP (ref 7–13)
PROTHROM AB SERPL-ACNC: 11.6 SEC — SIGNIFICANT CHANGE UP (ref 9.9–13.4)
RBC # BLD: 3.91 M/UL — SIGNIFICANT CHANGE UP (ref 3.8–5.2)
RBC # FLD: 13.7 % — SIGNIFICANT CHANGE UP (ref 10.3–14.5)
T PALLIDUM AB TITR SER: NEGATIVE — SIGNIFICANT CHANGE UP
WBC # BLD: 8.53 K/UL — SIGNIFICANT CHANGE UP (ref 3.8–10.5)
WBC # FLD AUTO: 8.53 K/UL — SIGNIFICANT CHANGE UP (ref 3.8–10.5)

## 2025-07-16 PROCEDURE — 76816 OB US FOLLOW-UP PER FETUS: CPT | Mod: 26

## 2025-07-16 PROCEDURE — 76819 FETAL BIOPHYS PROFIL W/O NST: CPT | Mod: 26,59

## 2025-07-16 RX ORDER — CITRIC ACID/SODIUM CITRATE 300-500 MG
15 SOLUTION, ORAL ORAL EVERY 6 HOURS
Refills: 0 | Status: DISCONTINUED | OUTPATIENT
Start: 2025-07-16 | End: 2025-07-16

## 2025-07-16 RX ORDER — OXYTOCIN-SODIUM CHLORIDE 0.9% IV SOLN 30 UNIT/500ML 30-0.9/5 UT/ML-%
167 SOLUTION INTRAVENOUS
Qty: 30 | Refills: 0 | Status: DISCONTINUED | OUTPATIENT
Start: 2025-07-16 | End: 2025-07-16

## 2025-07-16 RX ORDER — DIPHENHYDRAMINE HCL 12.5MG/5ML
25 ELIXIR ORAL ONCE
Refills: 0 | Status: COMPLETED | OUTPATIENT
Start: 2025-07-16 | End: 2025-07-16

## 2025-07-16 RX ORDER — LEVOTHYROXINE SODIUM 300 MCG
125 TABLET ORAL DAILY
Refills: 0 | Status: DISCONTINUED | OUTPATIENT
Start: 2025-07-16 | End: 2025-07-16

## 2025-07-16 RX ORDER — SODIUM CHLORIDE 9 G/1000ML
1000 INJECTION, SOLUTION INTRAVENOUS
Refills: 0 | Status: DISCONTINUED | OUTPATIENT
Start: 2025-07-16 | End: 2025-07-16

## 2025-07-16 RX ADMIN — Medication 15 MILLILITER(S): at 00:31

## 2025-07-16 RX ADMIN — Medication 125 MICROGRAM(S): at 05:26

## 2025-07-16 RX ADMIN — Medication 25 MILLIGRAM(S): at 02:32

## 2025-07-16 NOTE — PROGRESS NOTE ADULT - ASSESSMENT
34y  @34w5d admitted after falling off a stool at 145p on 7/15. Pt did not have abdominal trauma during the fall. Pt feeling well this morning.    #sp fall  - Continuous monitoring  - No abdominal pain  - f/u AM CBC/coags    #Fetal wellbeing  - Continuous monitoring    #Maternal wellbeing  - NPO; maintenance IVF  - SCDs for DVT ppx  - PNV/Iron/Folic acid  - T&S q3d while inpatient    Beronica Mahmood PGY3

## 2025-07-16 NOTE — DISCHARGE NOTE ANTEPARTUM - MEDICATION SUMMARY - MEDICATIONS TO TAKE
I will START or STAY ON the medications listed below when I get home from the hospital:    aspirin 81 mg oral capsule  -- 1 cap(s) orally  -- Indication: For Other pregnancy-related conditions, antepartum    multivitamin, prenatal  -- Indication: For Other pregnancy-related conditions, antepartum    levothyroxine 100 mcg (0.1 mg) oral tablet  -- 1 tab(s) by mouth once a day  -- Indication: For Other pregnancy-related conditions, antepartum

## 2025-07-16 NOTE — DISCHARGE NOTE ANTEPARTUM - CARE PLAN
1 Principal Discharge DX:	Accidental fall  Assessment and plan of treatment:	-Monitor fetal kick counts  -Return for s/sx of labor (contractions, vaginal bleeding, leakage of fluid)  -F/up with OB within 1 week

## 2025-07-16 NOTE — PROGRESS NOTE ADULT - ATTENDING COMMENTS
OB Attending Progress Note    Patient seen and evaluated at bedside.  Agree with above.  Patient denies contractions, vaginal bleeding, leakage of fluid.  +good fetal movement.      Patient appears comfortable.   Abdomen soft and non tender.  Fundus non tender.    Ext NT b/l    NST reviewed and reactive   [place] rare ctx     Labs reviewed and wnl     34y  @34w5d admitted after falling off a stool at 145p on 7/15. Pt did not have abdominal trauma during the fall. Pt feeling well.  Patient has completed 24 hours of monitoring with rare ctx seen and reactive FHR tracing.   BPP  performed by [facility].   -for d/c home with labor precautions, bleeding precautions and instructions on fetal movement  -f/u in office in one to two weeks       GUERITA Mcgraw MD

## 2025-07-16 NOTE — DISCHARGE NOTE ANTEPARTUM - SWOLLEN AREA ON THE LEG THAT IS PAINFUL, RED OR HOT
ASSESSMENT/PLAN:    I have reviewed the nursing notes.  I have reviewed the findings, diagnosis, plan and need for follow up with the patient.     1. Acute mastitis of left breast  - dicloxacillin (DYNAPEN) 500 MG capsule; Take 1 capsule (500 mg) by mouth 4 times daily for 10 days  Dispense: 40 capsule; Refill: 0  Take full course for mastitis.   Follow up with Sharmila Saunders, lactation specialist if any persistent or worsening concerns or onset of high fevers. She may have further instructions for you.   We discussed nursing or pumping every 2 hours and use of moist warm compresses to relieve blocked ducts that have caused mastitis. Massage the area. Make take tylenol or ibuprofen for fevers or chills.    Discussed warning signs/symptoms indicative of need to f/u    Follow up if symptoms persist or worsen or concerns    I explained my diagnostic considerations and recommendations to the patient, who voiced understanding and agreement with the treatment plan. All questions were answered. We discussed potential side effects of any prescribed or recommended therapies, as well as expectations for response to treatments.    Sanna Berger NP  8/8/2021  5:04 PM    HPI:  Noemí Garcia is a 31 year old female who presents to [facility] today for concerns of left breast started being sore yesterday, but got red and warm today with onset of low grade fevers now. She feels there is a little less milk coming out of the left than the right. The area is firm and left breast feels larger.  Has been pumping and/or nursing every 2.5-3 hours for about 15-20 minutes when pumping and nursing between 5-20 minutes. She does not report generalized body aches or feeling generally ill, but also verbalizes concern as she actually developed an infection of her uterine lining already following delivery and was hospitalized last weekend. She feels better since then, but worries about mastitis becoming much worse very fast. She has other  "children, but states this is the first she has nursed.     Past Medical History:   Diagnosis Date     Abnormal Pap smear of cervix      Cancer (H)      Cerebral infarction (H)      Depressive disorder      Past Surgical History:   Procedure Laterality Date     ABDOMEN SURGERY       BIOPSY       COLONOSCOPY       COLPOSCOPY       GENITOURINARY SURGERY       HEAD & NECK SURGERY       Social History     Tobacco Use     Smoking status: Former Smoker     Types: Vaping Device     Smokeless tobacco: Never Used     Tobacco comment: 12/23/20: 1-2 per day   Substance Use Topics     Alcohol use: Not Currently     Current Outpatient Medications   Medication Sig Dispense Refill     dicloxacillin (DYNAPEN) 500 MG capsule Take 1 capsule (500 mg) by mouth 4 times daily for 10 days 40 capsule 0     clindamycin-benzoyl peroxide (BENZACLIN) 1-5 % external gel Apply topically daily       Prenatal Vit-Fe Fumarate-FA (PRENATAL MULTIVITAMIN W/IRON) 27-0.8 MG tablet Take 1 tablet by mouth daily       Allergies   Allergen Reactions     Adhesive Tape Other (See Comments)     Blistered skin     Past medical history, past surgical history, current medications and allergies reviewed and accurate to the best of my knowledge.      ROS:  Refer to HPI    /76 (BP Location: Left arm, Patient Position: Sitting, Cuff Size: Adult Regular)   Pulse 76   Temp 99.7  F (37.6  C) (Tympanic)   Resp 16   Ht 1.575 m (5' 2\")   Wt 60.4 kg (133 lb 1.6 oz)   LMP 11/03/2020   Breastfeeding Yes   BMI 24.34 kg/m      EXAM:  General Appearance: Well appearing 31 year old female, appropriate appearance for age. No acute distress  Respiratory: normal chest wall and respirations.  Normal effort.  Clear to auscultation bilaterally, no wheezing, crackles or rhonchi.  No increased work of breathing.  No cough appreciated.  Cardiac: RRR with no murmurs   Musculoskeletal:  Equal movement of bilateral upper extremities.  Equal movement of bilateral lower " extremities.  Normal gait.    Dermatological: left breast: left side of nipple at 3:00 location, there is a firm, red, warm, and tender area approximately 4 cm in diameter. Skin is intact.   Psychological: normal affect, alert, oriented, and pleasant.      Statement Selected

## 2025-07-16 NOTE — DISCHARGE NOTE ANTEPARTUM - HOSPITAL COURSE
34y  @34w5d admitted after falling off a stool at 145p on 7/15. Pt did not have abdominal trauma during the fall. Pt feeling well this morning. Fetal monitoring reassuring. BPP . Cleared for d/c by OB.

## 2025-07-16 NOTE — DISCHARGE NOTE ANTEPARTUM - CARE PROVIDER_API CALL
Chaya Spencer)  Obstetrics & Gynecology  [address] [facility] [address] [facility], [address]  [place] [address]  Phone: [phone]  Fax: [phone]  Follow Up Time:    Chaya Spencer)  Obstetrics & Gynecology  [address] [facility], [address]  [place] [address]  Phone: [phone]  Fax: [phone]  Follow Up Time: 1 week

## 2025-07-16 NOTE — DISCHARGE NOTE ANTEPARTUM - PLAN OF CARE
-Monitor fetal kick counts  -Return for s/sx of labor (contractions, vaginal bleeding, leakage of fluid)  -F/up with OB within 1 week

## 2025-07-16 NOTE — PROGRESS NOTE ADULT - SUBJECTIVE AND OBJECTIVE BOX
R3 Antepartum Note, HD#2    Patient seen and examined at bedside, no acute overnight events. No acute complaints. Pt reports +FM, denies LOF, VB, ctx, HA, epigastric pain, blurred vision, CP, SOB, N/V, fevers, and chills.    Vital Signs Last 24 Hours  T(C): 36.7 (07-16-25 @ 05:31), Max: 36.9 (07-15-25 @ 15:45)  HR: 92 (07-16-25 @ 07:24) (65 - 116)  BP: 122/68 (07-16-25 @ 05:31) (102/61 - 122/68)  RR: 14 (07-15-25 @ 21:58) (14 - 14)  SpO2: 97% (07-16-25 @ 07:29) (90% - 100%)    CAPILLARY BLOOD GLUCOSE      POCT Blood Glucose.: 79 mg/dL (16 Jul 2025 04:19)  POCT Blood Glucose.: 65 mg/dL (15 Jul 2025 22:14)      Physical Exam:  General: NAD  Abdomen: Soft, non-tender, gravid  Ext: No pain or swelling      Labs:             11.3   8.53  )-----------( 400      ( 07-16 @ 06:10 )             35.1           PT/INR - ( 07-16 @ 06:10 )   PT: 11.6 sec;   INR: 1.00 ratio    PTT - ( 07-16 @ 06:10 )  PTT:28.6 sec        MEDICATIONS  (STANDING):  chlorhexidine 2% Cloths 1 Application(s) Topical daily  citric acid/sodium citrate Solution 15 milliLiter(s) Oral every 6 hours  dextrose 5% + sodium chloride 0.9%. 1000 milliLiter(s) (125 mL/Hr) IV Continuous <Continuous>  lactated ringers. 1000 milliLiter(s) (125 mL/Hr) IV Continuous <Continuous>  levothyroxine 125 MICROGram(s) Oral daily  oxytocin Infusion 167 milliUNIT(s)/Min (167 mL/Hr) IV Continuous <Continuous>  sodium chloride 0.9% lock flush 3 milliLiter(s) IV Push every 8 hours  sodium chloride 0.9%. 1000 milliLiter(s) (125 mL/Hr) IV Continuous <Continuous>    MEDICATIONS  (PRN):

## 2025-07-16 NOTE — DISCHARGE NOTE ANTEPARTUM - PATIENT PORTAL LINK FT
You can access the FollowMyHealth Patient Portal offered by [facility] by registering at the following website: [email or web address]. By joining [employer]’s FollowMyHealth portal, you will also be able to view your health information using other applications (apps) compatible with our system.

## 2025-07-16 NOTE — DISCHARGE NOTE ANTEPARTUM - NS MD DC FALL RISK RISK
For information on Fall & Injury Prevention, visit: [email or web address] OR  [email or web address] OR  [email or web address].cdc.gov/steadi/patient.html

## 2025-07-17 ENCOUNTER — APPOINTMENT (OUTPATIENT)
Dept: ANTEPARTUM | Facility: CLINIC | Age: 35
End: 2025-07-17

## 2025-07-19 LAB
CULTURE RESULTS: SIGNIFICANT CHANGE UP
SPECIMEN SOURCE: SIGNIFICANT CHANGE UP

## 2025-07-22 ENCOUNTER — NON-APPOINTMENT (OUTPATIENT)
Age: 35
End: 2025-07-22

## 2025-08-05 ENCOUNTER — ASOB RESULT (OUTPATIENT)
Age: 35
End: 2025-08-05

## 2025-08-05 ENCOUNTER — APPOINTMENT (OUTPATIENT)
Dept: MATERNAL FETAL MEDICINE | Facility: CLINIC | Age: 35
End: 2025-08-05
Payer: COMMERCIAL

## 2025-08-05 PROCEDURE — G0108 DIAB MANAGE TRN  PER INDIV: CPT | Mod: 95

## 2025-08-06 PROBLEM — G90.A POSTURAL ORTHOSTATIC TACHYCARDIA SYNDROME [POTS]: Chronic | Status: ACTIVE | Noted: 2025-07-15

## 2025-08-10 ENCOUNTER — OUTPATIENT (OUTPATIENT)
Dept: OUTPATIENT SERVICES | Facility: HOSPITAL | Age: 35
LOS: 1 days | End: 2025-08-10
Payer: COMMERCIAL

## 2025-08-10 DIAGNOSIS — M79.652 PAIN IN LEFT THIGH: ICD-10-CM

## 2025-08-10 DIAGNOSIS — Z00.8 ENCOUNTER FOR OTHER GENERAL EXAMINATION: ICD-10-CM

## 2025-08-10 PROCEDURE — 76882 US LMTD JT/FCL EVL NVASC XTR: CPT

## 2025-08-12 ENCOUNTER — ASOB RESULT (OUTPATIENT)
Age: 35
End: 2025-08-12

## 2025-08-12 ENCOUNTER — APPOINTMENT (OUTPATIENT)
Dept: ANTEPARTUM | Facility: CLINIC | Age: 35
End: 2025-08-12

## 2025-08-12 PROCEDURE — 76816 OB US FOLLOW-UP PER FETUS: CPT

## 2025-08-12 PROCEDURE — 76819 FETAL BIOPHYS PROFIL W/O NST: CPT | Mod: 59

## 2025-08-18 ENCOUNTER — INPATIENT (INPATIENT)
Facility: HOSPITAL | Age: 35
LOS: 4 days | Discharge: HOME | End: 2025-08-23
Attending: OBSTETRICS & GYNECOLOGY | Admitting: OBSTETRICS & GYNECOLOGY
Payer: COMMERCIAL

## 2025-08-18 VITALS — TEMPERATURE: 98 F | RESPIRATION RATE: 18 BRPM

## 2025-08-18 DIAGNOSIS — O24.419 GESTATIONAL DIABETES MELLITUS IN PREGNANCY, UNSPECIFIED CONTROL: ICD-10-CM

## 2025-08-18 LAB
BASOPHILS # BLD AUTO: 0.04 K/UL — SIGNIFICANT CHANGE UP (ref 0–0.2)
BASOPHILS NFR BLD AUTO: 0.4 % — SIGNIFICANT CHANGE UP (ref 0–2)
BLD GP AB SCN SERPL QL: NEGATIVE — SIGNIFICANT CHANGE UP
EOSINOPHIL # BLD AUTO: 0.16 K/UL — SIGNIFICANT CHANGE UP (ref 0–0.5)
EOSINOPHIL NFR BLD AUTO: 1.6 % — SIGNIFICANT CHANGE UP (ref 0–6)
GLUCOSE BLDC GLUCOMTR-MCNC: 78 MG/DL — SIGNIFICANT CHANGE UP (ref 70–99)
GLUCOSE BLDC GLUCOMTR-MCNC: 91 MG/DL — SIGNIFICANT CHANGE UP (ref 70–99)
GLUCOSE BLDC GLUCOMTR-MCNC: 93 MG/DL — SIGNIFICANT CHANGE UP (ref 70–99)
GLUCOSE BLDC GLUCOMTR-MCNC: 94 MG/DL — SIGNIFICANT CHANGE UP (ref 70–99)
HCT VFR BLD CALC: 37.9 % — SIGNIFICANT CHANGE UP (ref 34.5–45)
HGB BLD-MCNC: 12.6 G/DL — SIGNIFICANT CHANGE UP (ref 11.5–15.5)
IMM GRANULOCYTES # BLD AUTO: 0.08 K/UL — HIGH (ref 0–0.07)
IMM GRANULOCYTES NFR BLD AUTO: 0.8 % — SIGNIFICANT CHANGE UP (ref 0–0.9)
LYMPHOCYTES # BLD AUTO: 2.44 K/UL — SIGNIFICANT CHANGE UP (ref 1–3.3)
LYMPHOCYTES NFR BLD AUTO: 24.8 % — SIGNIFICANT CHANGE UP (ref 13–44)
MCHC RBC-ENTMCNC: 29.3 PG — SIGNIFICANT CHANGE UP (ref 27–34)
MCHC RBC-ENTMCNC: 33.2 G/DL — SIGNIFICANT CHANGE UP (ref 32–36)
MCV RBC AUTO: 88.1 FL — SIGNIFICANT CHANGE UP (ref 80–100)
MONOCYTES # BLD AUTO: 1.16 K/UL — HIGH (ref 0–0.9)
MONOCYTES NFR BLD AUTO: 11.8 % — SIGNIFICANT CHANGE UP (ref 2–14)
NEUTROPHILS # BLD AUTO: 5.94 K/UL — SIGNIFICANT CHANGE UP (ref 1.8–7.4)
NEUTROPHILS NFR BLD AUTO: 60.6 % — SIGNIFICANT CHANGE UP (ref 43–77)
NRBC # BLD AUTO: 0 K/UL — SIGNIFICANT CHANGE UP (ref 0–0)
NRBC # FLD: 0 K/UL — SIGNIFICANT CHANGE UP (ref 0–0)
NRBC BLD AUTO-RTO: 0 /100 WBCS — SIGNIFICANT CHANGE UP (ref 0–0)
PLATELET # BLD AUTO: 361 K/UL — SIGNIFICANT CHANGE UP (ref 150–400)
PMV BLD: 10.9 FL — SIGNIFICANT CHANGE UP (ref 7–13)
RBC # BLD: 4.3 M/UL — SIGNIFICANT CHANGE UP (ref 3.8–5.2)
RBC # FLD: 13.9 % — SIGNIFICANT CHANGE UP (ref 10.3–14.5)
RH IG SCN BLD-IMP: POSITIVE — SIGNIFICANT CHANGE UP
WBC # BLD: 9.82 K/UL — SIGNIFICANT CHANGE UP (ref 3.8–10.5)
WBC # FLD AUTO: 9.82 K/UL — SIGNIFICANT CHANGE UP (ref 3.8–10.5)

## 2025-08-18 RX ORDER — SODIUM CHLORIDE 9 G/1000ML
1000 INJECTION, SOLUTION INTRAVENOUS
Refills: 0 | Status: DISCONTINUED | OUTPATIENT
Start: 2025-08-18 | End: 2025-08-19

## 2025-08-18 RX ORDER — OXYTOCIN-SODIUM CHLORIDE 0.9% IV SOLN 30 UNIT/500ML 30-0.9/5 UT/ML-%
167 SOLUTION INTRAVENOUS
Qty: 30 | Refills: 0 | Status: DISCONTINUED | OUTPATIENT
Start: 2025-08-18 | End: 2025-08-19

## 2025-08-18 RX ORDER — OXYTOCIN-SODIUM CHLORIDE 0.9% IV SOLN 30 UNIT/500ML 30-0.9/5 UT/ML-%
2 SOLUTION INTRAVENOUS
Qty: 30 | Refills: 0 | Status: DISCONTINUED | OUTPATIENT
Start: 2025-08-18 | End: 2025-08-19

## 2025-08-18 RX ORDER — CITRIC ACID/SODIUM CITRATE 300-500 MG
15 SOLUTION, ORAL ORAL EVERY 6 HOURS
Refills: 0 | Status: DISCONTINUED | OUTPATIENT
Start: 2025-08-18 | End: 2025-08-19

## 2025-08-18 RX ADMIN — Medication 1000 MILLILITER(S): at 16:26

## 2025-08-18 RX ADMIN — SODIUM CHLORIDE 125 MILLILITER(S): 9 INJECTION, SOLUTION INTRAVENOUS at 09:13

## 2025-08-18 RX ADMIN — Medication 1 APPLICATION(S): at 07:45

## 2025-08-18 RX ADMIN — SODIUM CHLORIDE 125 MILLILITER(S): 9 INJECTION, SOLUTION INTRAVENOUS at 22:03

## 2025-08-19 ENCOUNTER — TRANSCRIPTION ENCOUNTER (OUTPATIENT)
Age: 35
End: 2025-08-19

## 2025-08-19 LAB
GLUCOSE BLDC GLUCOMTR-MCNC: 79 MG/DL — SIGNIFICANT CHANGE UP (ref 70–99)
GLUCOSE BLDC GLUCOMTR-MCNC: 89 MG/DL — SIGNIFICANT CHANGE UP (ref 70–99)
GLUCOSE BLDC GLUCOMTR-MCNC: 90 MG/DL — SIGNIFICANT CHANGE UP (ref 70–99)
GLUCOSE BLDC GLUCOMTR-MCNC: 92 MG/DL — SIGNIFICANT CHANGE UP (ref 70–99)
GLUCOSE BLDC GLUCOMTR-MCNC: 93 MG/DL — SIGNIFICANT CHANGE UP (ref 70–99)
GLUCOSE BLDC GLUCOMTR-MCNC: 95 MG/DL — SIGNIFICANT CHANGE UP (ref 70–99)
T PALLIDUM AB TITR SER: NEGATIVE — SIGNIFICANT CHANGE UP

## 2025-08-19 DEVICE — SURGICEL SNOW 2 X 4": Type: IMPLANTABLE DEVICE | Status: FUNCTIONAL

## 2025-08-19 RX ORDER — SENNA 187 MG
2 TABLET ORAL AT BEDTIME
Refills: 0 | Status: DISCONTINUED | OUTPATIENT
Start: 2025-08-19 | End: 2025-08-23

## 2025-08-19 RX ORDER — MODIFIED LANOLIN 100 %
1 CREAM (GRAM) TOPICAL EVERY 6 HOURS
Refills: 0 | Status: DISCONTINUED | OUTPATIENT
Start: 2025-08-19 | End: 2025-08-23

## 2025-08-19 RX ORDER — FERROUS SULFATE 137(45) MG
325 TABLET, EXTENDED RELEASE ORAL DAILY
Refills: 0 | Status: DISCONTINUED | OUTPATIENT
Start: 2025-08-19 | End: 2025-08-23

## 2025-08-19 RX ORDER — SODIUM CHLORIDE 9 G/1000ML
1000 INJECTION, SOLUTION INTRAVENOUS ONCE
Refills: 0 | Status: COMPLETED | OUTPATIENT
Start: 2025-08-19 | End: 2025-08-19

## 2025-08-19 RX ORDER — CITRIC ACID/SODIUM CITRATE 300-500 MG
30 SOLUTION, ORAL ORAL ONCE
Refills: 0 | Status: COMPLETED | OUTPATIENT
Start: 2025-08-19 | End: 2025-08-19

## 2025-08-19 RX ORDER — OXYCODONE HYDROCHLORIDE 30 MG/1
5 TABLET ORAL ONCE
Refills: 0 | Status: DISCONTINUED | OUTPATIENT
Start: 2025-08-19 | End: 2025-08-23

## 2025-08-19 RX ORDER — SODIUM CHLORIDE 9 G/1000ML
1000 INJECTION, SOLUTION INTRAVENOUS
Refills: 0 | Status: DISCONTINUED | OUTPATIENT
Start: 2025-08-19 | End: 2025-08-20

## 2025-08-19 RX ORDER — LEVOTHYROXINE SODIUM 300 MCG
100 TABLET ORAL DAILY
Refills: 0 | Status: DISCONTINUED | OUTPATIENT
Start: 2025-08-19 | End: 2025-08-19

## 2025-08-19 RX ORDER — NALBUPHINE HYDROCHLORIDE 10 MG/ML
2.5 INJECTION INTRAMUSCULAR; INTRAVENOUS; SUBCUTANEOUS EVERY 6 HOURS
Refills: 0 | Status: DISCONTINUED | OUTPATIENT
Start: 2025-08-19 | End: 2025-08-19

## 2025-08-19 RX ORDER — DIPHENHYDRAMINE HCL 12.5MG/5ML
25 ELIXIR ORAL EVERY 6 HOURS
Refills: 0 | Status: DISCONTINUED | OUTPATIENT
Start: 2025-08-19 | End: 2025-08-23

## 2025-08-19 RX ORDER — PIPERACILLIN-TAZO-DEXTROSE,ISO 3.375G/5
4.5 IV SOLUTION, PIGGYBACK PREMIX FROZEN(ML) INTRAVENOUS EVERY 8 HOURS
Refills: 0 | Status: DISCONTINUED | OUTPATIENT
Start: 2025-08-19 | End: 2025-08-20

## 2025-08-19 RX ORDER — ACETAMINOPHEN 500 MG/5ML
1000 LIQUID (ML) ORAL ONCE
Refills: 0 | Status: COMPLETED | OUTPATIENT
Start: 2025-08-19 | End: 2025-08-19

## 2025-08-19 RX ORDER — ACETAMINOPHEN 500 MG/5ML
975 LIQUID (ML) ORAL
Refills: 0 | Status: DISCONTINUED | OUTPATIENT
Start: 2025-08-19 | End: 2025-08-23

## 2025-08-19 RX ORDER — PIPERACILLIN-TAZO-DEXTROSE,ISO 3.375G/5
4.5 IV SOLUTION, PIGGYBACK PREMIX FROZEN(ML) INTRAVENOUS EVERY 8 HOURS
Refills: 0 | Status: DISCONTINUED | OUTPATIENT
Start: 2025-08-19 | End: 2025-08-19

## 2025-08-19 RX ORDER — OXYCODONE HYDROCHLORIDE 30 MG/1
5 TABLET ORAL
Refills: 0 | Status: DISCONTINUED | OUTPATIENT
Start: 2025-08-19 | End: 2025-08-19

## 2025-08-19 RX ORDER — ONDANSETRON HCL/PF 4 MG/2 ML
4 VIAL (ML) INJECTION ONCE
Refills: 0 | Status: COMPLETED | OUTPATIENT
Start: 2025-08-19 | End: 2025-08-19

## 2025-08-19 RX ORDER — OXYTOCIN-SODIUM CHLORIDE 0.9% IV SOLN 30 UNIT/500ML 30-0.9/5 UT/ML-%
42 SOLUTION INTRAVENOUS
Qty: 30 | Refills: 0 | Status: DISCONTINUED | OUTPATIENT
Start: 2025-08-19 | End: 2025-08-20

## 2025-08-19 RX ORDER — IBUPROFEN 200 MG
600 TABLET ORAL EVERY 6 HOURS
Refills: 0 | Status: COMPLETED | OUTPATIENT
Start: 2025-08-19 | End: 2026-07-18

## 2025-08-19 RX ORDER — LEVOTHYROXINE SODIUM 300 MCG
125 TABLET ORAL DAILY
Refills: 0 | Status: DISCONTINUED | OUTPATIENT
Start: 2025-08-19 | End: 2025-08-19

## 2025-08-19 RX ORDER — DEXAMETHASONE 0.5 MG/1
4 TABLET ORAL EVERY 6 HOURS
Refills: 0 | Status: DISCONTINUED | OUTPATIENT
Start: 2025-08-19 | End: 2025-08-19

## 2025-08-19 RX ORDER — SIMETHICONE 80 MG
80 TABLET,CHEWABLE ORAL EVERY 4 HOURS
Refills: 0 | Status: DISCONTINUED | OUTPATIENT
Start: 2025-08-19 | End: 2025-08-23

## 2025-08-19 RX ORDER — HEPARIN SODIUM 1000 [USP'U]/ML
5000 INJECTION INTRAVENOUS; SUBCUTANEOUS EVERY 12 HOURS
Refills: 0 | Status: DISCONTINUED | OUTPATIENT
Start: 2025-08-19 | End: 2025-08-23

## 2025-08-19 RX ORDER — OXYCODONE HYDROCHLORIDE 30 MG/1
5 TABLET ORAL
Refills: 0 | Status: COMPLETED | OUTPATIENT
Start: 2025-08-19 | End: 2025-08-26

## 2025-08-19 RX ORDER — BUTORPHANOL TARTRATE 1 MG/ML
0.5 INJECTION, SOLUTION INTRAMUSCULAR; INTRAVENOUS EVERY 6 HOURS
Refills: 0 | Status: DISCONTINUED | OUTPATIENT
Start: 2025-08-19 | End: 2025-08-19

## 2025-08-19 RX ORDER — MAGNESIUM HYDROXIDE 400 MG/5ML
30 SUSPENSION ORAL
Refills: 0 | Status: DISCONTINUED | OUTPATIENT
Start: 2025-08-19 | End: 2025-08-23

## 2025-08-19 RX ORDER — NALOXONE HYDROCHLORIDE 0.4 MG/ML
0.1 INJECTION, SOLUTION INTRAMUSCULAR; INTRAVENOUS; SUBCUTANEOUS
Refills: 0 | Status: DISCONTINUED | OUTPATIENT
Start: 2025-08-19 | End: 2025-08-19

## 2025-08-19 RX ORDER — KETOROLAC TROMETHAMINE 30 MG/ML
30 INJECTION, SOLUTION INTRAMUSCULAR; INTRAVENOUS EVERY 6 HOURS
Refills: 0 | Status: DISCONTINUED | OUTPATIENT
Start: 2025-08-19 | End: 2025-08-20

## 2025-08-19 RX ORDER — OXYCODONE HYDROCHLORIDE 30 MG/1
10 TABLET ORAL
Refills: 0 | Status: DISCONTINUED | OUTPATIENT
Start: 2025-08-19 | End: 2025-08-19

## 2025-08-19 RX ORDER — ONDANSETRON HCL/PF 4 MG/2 ML
4 VIAL (ML) INJECTION EVERY 6 HOURS
Refills: 0 | Status: DISCONTINUED | OUTPATIENT
Start: 2025-08-19 | End: 2025-08-19

## 2025-08-19 RX ORDER — PRENATAL 136/IRON/FOLIC ACID 27 MG-1 MG
1 TABLET ORAL DAILY
Refills: 0 | Status: DISCONTINUED | OUTPATIENT
Start: 2025-08-19 | End: 2025-08-23

## 2025-08-19 RX ADMIN — SODIUM CHLORIDE 1000 MILLILITER(S): 9 INJECTION, SOLUTION INTRAVENOUS at 08:39

## 2025-08-19 RX ADMIN — Medication 20 MILLIGRAM(S): at 17:42

## 2025-08-19 RX ADMIN — Medication 200 GRAM(S): at 09:08

## 2025-08-19 RX ADMIN — Medication 400 MILLIGRAM(S): at 01:25

## 2025-08-19 RX ADMIN — Medication 200 GRAM(S): at 17:01

## 2025-08-19 RX ADMIN — Medication 125 MICROGRAM(S): at 12:30

## 2025-08-19 RX ADMIN — Medication 400 MILLIGRAM(S): at 22:45

## 2025-08-19 RX ADMIN — Medication 4 MILLIGRAM(S): at 09:08

## 2025-08-19 RX ADMIN — Medication 1 APPLICATION(S): at 12:09

## 2025-08-19 RX ADMIN — Medication 400 MILLIGRAM(S): at 09:05

## 2025-08-19 RX ADMIN — Medication 30 MILLILITER(S): at 17:43

## 2025-08-19 RX ADMIN — Medication 1000 MILLIGRAM(S): at 01:55

## 2025-08-20 LAB
BASOPHILS # BLD AUTO: 0.07 K/UL — SIGNIFICANT CHANGE UP (ref 0–0.2)
BASOPHILS # BLD MANUAL: 0 K/UL — SIGNIFICANT CHANGE UP (ref 0–0.2)
BASOPHILS NFR BLD AUTO: 0.3 % — SIGNIFICANT CHANGE UP (ref 0–2)
BASOPHILS NFR BLD MANUAL: 0 % — SIGNIFICANT CHANGE UP (ref 0–2)
EOSINOPHIL # BLD AUTO: 0.01 K/UL — SIGNIFICANT CHANGE UP (ref 0–0.5)
EOSINOPHIL # BLD MANUAL: 0 K/UL — SIGNIFICANT CHANGE UP (ref 0–0.5)
EOSINOPHIL NFR BLD AUTO: 0 % — SIGNIFICANT CHANGE UP (ref 0–6)
EOSINOPHIL NFR BLD MANUAL: 0 % — SIGNIFICANT CHANGE UP (ref 0–6)
GIANT PLATELETS BLD QL SMEAR: PRESENT
HCT VFR BLD CALC: 31.9 % — LOW (ref 34.5–45)
HGB BLD-MCNC: 10.6 G/DL — LOW (ref 11.5–15.5)
IMM GRANULOCYTES # BLD AUTO: 0.28 K/UL — HIGH (ref 0–0.07)
IMM GRANULOCYTES NFR BLD AUTO: 1.1 % — HIGH (ref 0–0.9)
LYMPHOCYTES # BLD AUTO: 1.61 K/UL — SIGNIFICANT CHANGE UP (ref 1–3.3)
LYMPHOCYTES # BLD MANUAL: 1.5 K/UL — SIGNIFICANT CHANGE UP (ref 1–3.3)
LYMPHOCYTES NFR BLD AUTO: 6.4 % — LOW (ref 13–44)
LYMPHOCYTES NFR BLD MANUAL: 6 % — LOW (ref 13–44)
MCHC RBC-ENTMCNC: 29.1 PG — SIGNIFICANT CHANGE UP (ref 27–34)
MCHC RBC-ENTMCNC: 33.2 G/DL — SIGNIFICANT CHANGE UP (ref 32–36)
MCV RBC AUTO: 87.6 FL — SIGNIFICANT CHANGE UP (ref 80–100)
MONOCYTES # BLD AUTO: 1.49 K/UL — HIGH (ref 0–0.9)
MONOCYTES # BLD MANUAL: 1.3 K/UL — HIGH (ref 0–0.9)
MONOCYTES NFR BLD AUTO: 5.9 % — SIGNIFICANT CHANGE UP (ref 2–14)
MONOCYTES NFR BLD MANUAL: 5.2 % — SIGNIFICANT CHANGE UP (ref 2–14)
NEUTROPHILS # BLD AUTO: 21.62 K/UL — HIGH (ref 1.8–7.4)
NEUTROPHILS # BLD MANUAL: 22.25 K/UL — HIGH (ref 1.8–7.4)
NEUTROPHILS NFR BLD AUTO: 86.3 % — HIGH (ref 43–77)
NEUTROPHILS NFR BLD MANUAL: 88.8 % — HIGH (ref 43–77)
NRBC # BLD AUTO: 0 K/UL — SIGNIFICANT CHANGE UP (ref 0–0)
NRBC # FLD: 0 K/UL — SIGNIFICANT CHANGE UP (ref 0–0)
NRBC BLD AUTO-RTO: 0 /100 WBCS — SIGNIFICANT CHANGE UP (ref 0–0)
PLAT MORPH BLD: NORMAL — SIGNIFICANT CHANGE UP
PLATELET # BLD AUTO: 298 K/UL — SIGNIFICANT CHANGE UP (ref 150–400)
PLATELET COUNT - ESTIMATE: NORMAL — SIGNIFICANT CHANGE UP
PMV BLD: 10.8 FL — SIGNIFICANT CHANGE UP (ref 7–13)
RBC # BLD: 3.64 M/UL — LOW (ref 3.8–5.2)
RBC # FLD: 14.2 % — SIGNIFICANT CHANGE UP (ref 10.3–14.5)
RBC BLD AUTO: NORMAL — SIGNIFICANT CHANGE UP
WBC # BLD: 25.06 K/UL — HIGH (ref 3.8–10.5)
WBC # FLD AUTO: 25.06 K/UL — HIGH (ref 3.8–10.5)

## 2025-08-20 RX ORDER — LEVOTHYROXINE SODIUM 300 MCG
125 TABLET ORAL DAILY
Refills: 0 | Status: DISCONTINUED | OUTPATIENT
Start: 2025-08-20 | End: 2025-08-23

## 2025-08-20 RX ORDER — IBUPROFEN 200 MG
600 TABLET ORAL EVERY 6 HOURS
Refills: 0 | Status: DISCONTINUED | OUTPATIENT
Start: 2025-08-20 | End: 2025-08-23

## 2025-08-20 RX ADMIN — Medication 975 MILLIGRAM(S): at 13:40

## 2025-08-20 RX ADMIN — KETOROLAC TROMETHAMINE 30 MILLIGRAM(S): 30 INJECTION, SOLUTION INTRAMUSCULAR; INTRAVENOUS at 16:50

## 2025-08-20 RX ADMIN — Medication 975 MILLIGRAM(S): at 07:00

## 2025-08-20 RX ADMIN — HEPARIN SODIUM 5000 UNIT(S): 1000 INJECTION INTRAVENOUS; SUBCUTANEOUS at 18:38

## 2025-08-20 RX ADMIN — Medication 975 MILLIGRAM(S): at 06:37

## 2025-08-20 RX ADMIN — KETOROLAC TROMETHAMINE 30 MILLIGRAM(S): 30 INJECTION, SOLUTION INTRAMUSCULAR; INTRAVENOUS at 02:19

## 2025-08-20 RX ADMIN — Medication 975 MILLIGRAM(S): at 19:30

## 2025-08-20 RX ADMIN — KETOROLAC TROMETHAMINE 30 MILLIGRAM(S): 30 INJECTION, SOLUTION INTRAMUSCULAR; INTRAVENOUS at 21:00

## 2025-08-20 RX ADMIN — Medication 975 MILLIGRAM(S): at 18:38

## 2025-08-20 RX ADMIN — KETOROLAC TROMETHAMINE 30 MILLIGRAM(S): 30 INJECTION, SOLUTION INTRAMUSCULAR; INTRAVENOUS at 08:51

## 2025-08-20 RX ADMIN — HEPARIN SODIUM 5000 UNIT(S): 1000 INJECTION INTRAVENOUS; SUBCUTANEOUS at 06:38

## 2025-08-20 RX ADMIN — KETOROLAC TROMETHAMINE 30 MILLIGRAM(S): 30 INJECTION, SOLUTION INTRAMUSCULAR; INTRAVENOUS at 03:00

## 2025-08-20 RX ADMIN — KETOROLAC TROMETHAMINE 30 MILLIGRAM(S): 30 INJECTION, SOLUTION INTRAMUSCULAR; INTRAVENOUS at 15:26

## 2025-08-20 RX ADMIN — KETOROLAC TROMETHAMINE 30 MILLIGRAM(S): 30 INJECTION, SOLUTION INTRAMUSCULAR; INTRAVENOUS at 22:09

## 2025-08-20 RX ADMIN — Medication 125 MICROGRAM(S): at 06:37

## 2025-08-20 RX ADMIN — Medication 975 MILLIGRAM(S): at 12:18

## 2025-08-20 RX ADMIN — KETOROLAC TROMETHAMINE 30 MILLIGRAM(S): 30 INJECTION, SOLUTION INTRAMUSCULAR; INTRAVENOUS at 09:56

## 2025-08-20 RX ADMIN — Medication 500 MILLIGRAM(S): at 12:18

## 2025-08-20 RX ADMIN — Medication 200 GRAM(S): at 09:47

## 2025-08-20 RX ADMIN — MAGNESIUM HYDROXIDE 30 MILLILITER(S): 400 SUSPENSION ORAL at 16:54

## 2025-08-20 RX ADMIN — Medication 200 GRAM(S): at 01:28

## 2025-08-20 RX ADMIN — Medication 325 MILLIGRAM(S): at 12:19

## 2025-08-21 LAB
APPEARANCE UR: ABNORMAL
BACTERIA # UR AUTO: NEGATIVE /HPF — SIGNIFICANT CHANGE UP
BILIRUB UR-MCNC: NEGATIVE — SIGNIFICANT CHANGE UP
CAST: 1 /LPF — SIGNIFICANT CHANGE UP (ref 0–4)
COLOR SPEC: ABNORMAL
DIFF PNL FLD: ABNORMAL
GLUCOSE UR QL: NEGATIVE MG/DL — SIGNIFICANT CHANGE UP
HCT VFR BLD CALC: 31.7 % — LOW (ref 34.5–45)
HGB BLD-MCNC: 10.4 G/DL — LOW (ref 11.5–15.5)
KETONES UR QL: NEGATIVE MG/DL — SIGNIFICANT CHANGE UP
LEUKOCYTE ESTERASE UR-ACNC: ABNORMAL
MCHC RBC-ENTMCNC: 29 PG — SIGNIFICANT CHANGE UP (ref 27–34)
MCHC RBC-ENTMCNC: 32.8 G/DL — SIGNIFICANT CHANGE UP (ref 32–36)
MCV RBC AUTO: 88.3 FL — SIGNIFICANT CHANGE UP (ref 80–100)
NITRITE UR-MCNC: NEGATIVE — SIGNIFICANT CHANGE UP
NRBC # BLD AUTO: 0 K/UL — SIGNIFICANT CHANGE UP (ref 0–0)
NRBC # FLD: 0 K/UL — SIGNIFICANT CHANGE UP (ref 0–0)
NRBC BLD AUTO-RTO: 0 /100 WBCS — SIGNIFICANT CHANGE UP (ref 0–0)
PH UR: 6 — SIGNIFICANT CHANGE UP (ref 5–8)
PLATELET # BLD AUTO: 344 K/UL — SIGNIFICANT CHANGE UP (ref 150–400)
PMV BLD: 10.6 FL — SIGNIFICANT CHANGE UP (ref 7–13)
PROT UR-MCNC: 100 MG/DL
RBC # BLD: 3.59 M/UL — LOW (ref 3.8–5.2)
RBC # FLD: 14.4 % — SIGNIFICANT CHANGE UP (ref 10.3–14.5)
RBC CASTS # UR COMP ASSIST: 584 /HPF — HIGH (ref 0–4)
SP GR SPEC: 1.01 — SIGNIFICANT CHANGE UP (ref 1–1.03)
SQUAMOUS # UR AUTO: 66 /HPF — HIGH (ref 0–5)
UROBILINOGEN FLD QL: 0.2 MG/DL — SIGNIFICANT CHANGE UP (ref 0.2–1)
WBC # BLD: 21.66 K/UL — HIGH (ref 3.8–10.5)
WBC # FLD AUTO: 21.66 K/UL — HIGH (ref 3.8–10.5)
WBC UR QL: 112 /HPF — HIGH (ref 0–5)

## 2025-08-21 RX ORDER — WITCH HAZEL LEAF
1 FLUID EXTRACT MISCELLANEOUS EVERY 4 HOURS
Refills: 0 | Status: DISCONTINUED | OUTPATIENT
Start: 2025-08-21 | End: 2025-08-23

## 2025-08-21 RX ORDER — DIBUCAINE 10 MG/G
1 OINTMENT TOPICAL EVERY 6 HOURS
Refills: 0 | Status: DISCONTINUED | OUTPATIENT
Start: 2025-08-21 | End: 2025-08-23

## 2025-08-21 RX ORDER — PRAMOXINE HCL 1 %
1 GEL (GRAM) TOPICAL EVERY 4 HOURS
Refills: 0 | Status: DISCONTINUED | OUTPATIENT
Start: 2025-08-21 | End: 2025-08-23

## 2025-08-21 RX ORDER — OXYCODONE HYDROCHLORIDE 30 MG/1
5 TABLET ORAL
Refills: 0 | Status: DISCONTINUED | OUTPATIENT
Start: 2025-08-21 | End: 2025-08-23

## 2025-08-21 RX ORDER — BENZOCAINE 220 MG/G
1 SPRAY, METERED PERIODONTAL EVERY 6 HOURS
Refills: 0 | Status: DISCONTINUED | OUTPATIENT
Start: 2025-08-21 | End: 2025-08-23

## 2025-08-21 RX ORDER — HYDROCORTISONE 10 MG/G
1 CREAM TOPICAL EVERY 6 HOURS
Refills: 0 | Status: DISCONTINUED | OUTPATIENT
Start: 2025-08-21 | End: 2025-08-23

## 2025-08-21 RX ADMIN — Medication 600 MILLIGRAM(S): at 11:05

## 2025-08-21 RX ADMIN — Medication 975 MILLIGRAM(S): at 21:45

## 2025-08-21 RX ADMIN — Medication 600 MILLIGRAM(S): at 03:30

## 2025-08-21 RX ADMIN — Medication 600 MILLIGRAM(S): at 17:13

## 2025-08-21 RX ADMIN — Medication 975 MILLIGRAM(S): at 01:10

## 2025-08-21 RX ADMIN — OXYCODONE HYDROCHLORIDE 5 MILLIGRAM(S): 30 TABLET ORAL at 11:05

## 2025-08-21 RX ADMIN — Medication 500 MILLIGRAM(S): at 11:05

## 2025-08-21 RX ADMIN — Medication 975 MILLIGRAM(S): at 10:00

## 2025-08-21 RX ADMIN — Medication 325 MILLIGRAM(S): at 11:05

## 2025-08-21 RX ADMIN — HEPARIN SODIUM 5000 UNIT(S): 1000 INJECTION INTRAVENOUS; SUBCUTANEOUS at 17:12

## 2025-08-21 RX ADMIN — Medication 125 MICROGRAM(S): at 06:03

## 2025-08-21 RX ADMIN — Medication 600 MILLIGRAM(S): at 11:50

## 2025-08-21 RX ADMIN — Medication 80 MILLIGRAM(S): at 20:51

## 2025-08-21 RX ADMIN — Medication 975 MILLIGRAM(S): at 20:51

## 2025-08-21 RX ADMIN — Medication 975 MILLIGRAM(S): at 00:38

## 2025-08-21 RX ADMIN — Medication 975 MILLIGRAM(S): at 15:20

## 2025-08-21 RX ADMIN — Medication 975 MILLIGRAM(S): at 14:27

## 2025-08-21 RX ADMIN — Medication 600 MILLIGRAM(S): at 23:58

## 2025-08-21 RX ADMIN — OXYCODONE HYDROCHLORIDE 5 MILLIGRAM(S): 30 TABLET ORAL at 11:50

## 2025-08-21 RX ADMIN — Medication 600 MILLIGRAM(S): at 17:45

## 2025-08-21 RX ADMIN — Medication 600 MILLIGRAM(S): at 03:08

## 2025-08-21 RX ADMIN — HEPARIN SODIUM 5000 UNIT(S): 1000 INJECTION INTRAVENOUS; SUBCUTANEOUS at 06:04

## 2025-08-21 RX ADMIN — Medication 975 MILLIGRAM(S): at 09:20

## 2025-08-22 LAB
CULTURE RESULTS: NO GROWTH — SIGNIFICANT CHANGE UP
HCT VFR BLD CALC: 30.3 % — LOW (ref 34.5–45)
HGB BLD-MCNC: 10.1 G/DL — LOW (ref 11.5–15.5)
MCHC RBC-ENTMCNC: 29.4 PG — SIGNIFICANT CHANGE UP (ref 27–34)
MCHC RBC-ENTMCNC: 33.3 G/DL — SIGNIFICANT CHANGE UP (ref 32–36)
MCV RBC AUTO: 88.1 FL — SIGNIFICANT CHANGE UP (ref 80–100)
NRBC # BLD AUTO: 0 K/UL — SIGNIFICANT CHANGE UP (ref 0–0)
NRBC # FLD: 0 K/UL — SIGNIFICANT CHANGE UP (ref 0–0)
NRBC BLD AUTO-RTO: 0 /100 WBCS — SIGNIFICANT CHANGE UP (ref 0–0)
PLATELET # BLD AUTO: 372 K/UL — SIGNIFICANT CHANGE UP (ref 150–400)
PMV BLD: 10.5 FL — SIGNIFICANT CHANGE UP (ref 7–13)
RBC # BLD: 3.44 M/UL — LOW (ref 3.8–5.2)
RBC # FLD: 14.7 % — HIGH (ref 10.3–14.5)
SPECIMEN SOURCE: SIGNIFICANT CHANGE UP
WBC # BLD: 16.68 K/UL — HIGH (ref 3.8–10.5)
WBC # FLD AUTO: 16.68 K/UL — HIGH (ref 3.8–10.5)

## 2025-08-22 PROCEDURE — 93970 EXTREMITY STUDY: CPT | Mod: 26

## 2025-08-22 RX ORDER — CALCIUM CARBONATE 750 MG/1
2 TABLET ORAL THREE TIMES A DAY
Refills: 0 | Status: DISCONTINUED | OUTPATIENT
Start: 2025-08-22 | End: 2025-08-23

## 2025-08-22 RX ORDER — FUROSEMIDE 10 MG/ML
20 INJECTION INTRAMUSCULAR; INTRAVENOUS ONCE
Refills: 0 | Status: COMPLETED | OUTPATIENT
Start: 2025-08-22 | End: 2025-08-22

## 2025-08-22 RX ADMIN — Medication 500 MILLIGRAM(S): at 12:22

## 2025-08-22 RX ADMIN — Medication 975 MILLIGRAM(S): at 16:55

## 2025-08-22 RX ADMIN — Medication 600 MILLIGRAM(S): at 12:51

## 2025-08-22 RX ADMIN — Medication 1 TABLET(S): at 12:22

## 2025-08-22 RX ADMIN — FUROSEMIDE 20 MILLIGRAM(S): 10 INJECTION INTRAMUSCULAR; INTRAVENOUS at 09:43

## 2025-08-22 RX ADMIN — Medication 600 MILLIGRAM(S): at 12:21

## 2025-08-22 RX ADMIN — CALCIUM CARBONATE 2 TABLET(S): 750 TABLET ORAL at 02:46

## 2025-08-22 RX ADMIN — Medication 600 MILLIGRAM(S): at 18:01

## 2025-08-22 RX ADMIN — Medication 20 MILLIGRAM(S): at 02:50

## 2025-08-22 RX ADMIN — Medication 975 MILLIGRAM(S): at 09:50

## 2025-08-22 RX ADMIN — Medication 600 MILLIGRAM(S): at 06:30

## 2025-08-22 RX ADMIN — Medication 325 MILLIGRAM(S): at 12:22

## 2025-08-22 RX ADMIN — HEPARIN SODIUM 5000 UNIT(S): 1000 INJECTION INTRAVENOUS; SUBCUTANEOUS at 18:02

## 2025-08-22 RX ADMIN — Medication 600 MILLIGRAM(S): at 00:56

## 2025-08-22 RX ADMIN — Medication 975 MILLIGRAM(S): at 17:25

## 2025-08-22 RX ADMIN — Medication 600 MILLIGRAM(S): at 05:40

## 2025-08-22 RX ADMIN — Medication 975 MILLIGRAM(S): at 02:20

## 2025-08-22 RX ADMIN — HEPARIN SODIUM 5000 UNIT(S): 1000 INJECTION INTRAVENOUS; SUBCUTANEOUS at 05:40

## 2025-08-22 RX ADMIN — Medication 975 MILLIGRAM(S): at 21:33

## 2025-08-22 RX ADMIN — Medication 975 MILLIGRAM(S): at 09:19

## 2025-08-22 RX ADMIN — Medication 975 MILLIGRAM(S): at 03:10

## 2025-08-22 RX ADMIN — Medication 125 MICROGRAM(S): at 05:40

## 2025-08-22 RX ADMIN — Medication 975 MILLIGRAM(S): at 22:17

## 2025-08-23 VITALS
DIASTOLIC BLOOD PRESSURE: 82 MMHG | OXYGEN SATURATION: 100 % | HEART RATE: 64 BPM | RESPIRATION RATE: 18 BRPM | SYSTOLIC BLOOD PRESSURE: 111 MMHG | TEMPERATURE: 98 F

## 2025-08-23 LAB
ALBUMIN SERPL ELPH-MCNC: 2.6 G/DL — LOW (ref 3.3–5)
ALP SERPL-CCNC: 153 U/L — HIGH (ref 40–120)
ALT FLD-CCNC: 8 U/L — SIGNIFICANT CHANGE UP (ref 4–33)
ANION GAP SERPL CALC-SCNC: 11 MMOL/L — SIGNIFICANT CHANGE UP (ref 7–14)
AST SERPL-CCNC: 19 U/L — SIGNIFICANT CHANGE UP (ref 4–32)
BASOPHILS # BLD AUTO: 0.03 K/UL — SIGNIFICANT CHANGE UP (ref 0–0.2)
BASOPHILS NFR BLD AUTO: 0.2 % — SIGNIFICANT CHANGE UP (ref 0–2)
BILIRUB SERPL-MCNC: <0.2 MG/DL — SIGNIFICANT CHANGE UP (ref 0.2–1.2)
BUN SERPL-MCNC: 7 MG/DL — SIGNIFICANT CHANGE UP (ref 7–23)
CALCIUM SERPL-MCNC: 8.4 MG/DL — SIGNIFICANT CHANGE UP (ref 8.4–10.5)
CHLORIDE SERPL-SCNC: 110 MMOL/L — HIGH (ref 98–107)
CO2 SERPL-SCNC: 21 MMOL/L — LOW (ref 22–31)
CREAT SERPL-MCNC: 0.54 MG/DL — SIGNIFICANT CHANGE UP (ref 0.5–1.3)
EGFR: 122 ML/MIN/1.73M2 — SIGNIFICANT CHANGE UP
EGFR: 122 ML/MIN/1.73M2 — SIGNIFICANT CHANGE UP
EOSINOPHIL # BLD AUTO: 0.35 K/UL — SIGNIFICANT CHANGE UP (ref 0–0.5)
EOSINOPHIL NFR BLD AUTO: 2.9 % — SIGNIFICANT CHANGE UP (ref 0–6)
GLUCOSE SERPL-MCNC: 75 MG/DL — SIGNIFICANT CHANGE UP (ref 70–99)
HCT VFR BLD CALC: 30.9 % — LOW (ref 34.5–45)
HGB BLD-MCNC: 10.2 G/DL — LOW (ref 11.5–15.5)
IMM GRANULOCYTES # BLD AUTO: 0.1 K/UL — HIGH (ref 0–0.07)
IMM GRANULOCYTES NFR BLD AUTO: 0.8 % — SIGNIFICANT CHANGE UP (ref 0–0.9)
LYMPHOCYTES # BLD AUTO: 3.32 K/UL — HIGH (ref 1–3.3)
LYMPHOCYTES NFR BLD AUTO: 27.3 % — SIGNIFICANT CHANGE UP (ref 13–44)
MCHC RBC-ENTMCNC: 28.8 PG — SIGNIFICANT CHANGE UP (ref 27–34)
MCHC RBC-ENTMCNC: 33 G/DL — SIGNIFICANT CHANGE UP (ref 32–36)
MCV RBC AUTO: 87.3 FL — SIGNIFICANT CHANGE UP (ref 80–100)
MONOCYTES # BLD AUTO: 0.79 K/UL — SIGNIFICANT CHANGE UP (ref 0–0.9)
MONOCYTES NFR BLD AUTO: 6.5 % — SIGNIFICANT CHANGE UP (ref 2–14)
NEUTROPHILS # BLD AUTO: 7.57 K/UL — HIGH (ref 1.8–7.4)
NEUTROPHILS NFR BLD AUTO: 62.3 % — SIGNIFICANT CHANGE UP (ref 43–77)
NRBC # BLD AUTO: 0 K/UL — SIGNIFICANT CHANGE UP (ref 0–0)
NRBC # FLD: 0 K/UL — SIGNIFICANT CHANGE UP (ref 0–0)
NRBC BLD AUTO-RTO: 0 /100 WBCS — SIGNIFICANT CHANGE UP (ref 0–0)
PLATELET # BLD AUTO: 402 K/UL — HIGH (ref 150–400)
PMV BLD: 10.1 FL — SIGNIFICANT CHANGE UP (ref 7–13)
POTASSIUM SERPL-MCNC: 4 MMOL/L — SIGNIFICANT CHANGE UP (ref 3.5–5.3)
POTASSIUM SERPL-SCNC: 4 MMOL/L — SIGNIFICANT CHANGE UP (ref 3.5–5.3)
PROT SERPL-MCNC: 5.5 G/DL — LOW (ref 6–8.3)
RBC # BLD: 3.54 M/UL — LOW (ref 3.8–5.2)
RBC # FLD: 14.4 % — SIGNIFICANT CHANGE UP (ref 10.3–14.5)
SODIUM SERPL-SCNC: 142 MMOL/L — SIGNIFICANT CHANGE UP (ref 135–145)
WBC # BLD: 12.16 K/UL — HIGH (ref 3.8–10.5)
WBC # FLD AUTO: 12.16 K/UL — HIGH (ref 3.8–10.5)

## 2025-08-23 RX ORDER — ACETAMINOPHEN 500 MG/5ML
3 LIQUID (ML) ORAL
Qty: 0 | Refills: 0 | DISCHARGE
Start: 2025-08-23

## 2025-08-23 RX ORDER — CITRIC ACID/SODIUM CITRATE 300-500 MG
30 SOLUTION, ORAL ORAL ONCE
Refills: 0 | Status: COMPLETED | OUTPATIENT
Start: 2025-08-23 | End: 2025-08-23

## 2025-08-23 RX ORDER — FERROUS SULFATE 137(45) MG
1 TABLET, EXTENDED RELEASE ORAL
Qty: 0 | Refills: 0 | DISCHARGE
Start: 2025-08-23

## 2025-08-23 RX ORDER — FUROSEMIDE 10 MG/ML
20 INJECTION INTRAMUSCULAR; INTRAVENOUS ONCE
Refills: 0 | Status: COMPLETED | OUTPATIENT
Start: 2025-08-23 | End: 2025-08-23

## 2025-08-23 RX ORDER — IBUPROFEN 200 MG
1 TABLET ORAL
Qty: 0 | Refills: 0 | DISCHARGE
Start: 2025-08-23

## 2025-08-23 RX ADMIN — Medication 600 MILLIGRAM(S): at 01:00

## 2025-08-23 RX ADMIN — Medication 1 TABLET(S): at 12:07

## 2025-08-23 RX ADMIN — HEPARIN SODIUM 5000 UNIT(S): 1000 INJECTION INTRAVENOUS; SUBCUTANEOUS at 05:30

## 2025-08-23 RX ADMIN — Medication 325 MILLIGRAM(S): at 12:07

## 2025-08-23 RX ADMIN — Medication 600 MILLIGRAM(S): at 12:07

## 2025-08-23 RX ADMIN — Medication 975 MILLIGRAM(S): at 02:49

## 2025-08-23 RX ADMIN — Medication 600 MILLIGRAM(S): at 00:02

## 2025-08-23 RX ADMIN — CALCIUM CARBONATE 2 TABLET(S): 750 TABLET ORAL at 02:55

## 2025-08-23 RX ADMIN — Medication 600 MILLIGRAM(S): at 13:34

## 2025-08-23 RX ADMIN — Medication 975 MILLIGRAM(S): at 09:27

## 2025-08-23 RX ADMIN — FUROSEMIDE 20 MILLIGRAM(S): 10 INJECTION INTRAMUSCULAR; INTRAVENOUS at 15:03

## 2025-08-23 RX ADMIN — Medication 125 MICROGRAM(S): at 05:29

## 2025-08-23 RX ADMIN — Medication 975 MILLIGRAM(S): at 03:30

## 2025-08-23 RX ADMIN — Medication 975 MILLIGRAM(S): at 15:04

## 2025-08-23 RX ADMIN — Medication 975 MILLIGRAM(S): at 09:57

## 2025-08-23 RX ADMIN — Medication 500 MILLIGRAM(S): at 15:03

## 2025-08-23 RX ADMIN — Medication 30 MILLILITER(S): at 12:06

## 2025-08-23 RX ADMIN — Medication 20 MILLIGRAM(S): at 12:07
